# Patient Record
Sex: FEMALE | Race: WHITE | NOT HISPANIC OR LATINO | Employment: OTHER | URBAN - METROPOLITAN AREA
[De-identification: names, ages, dates, MRNs, and addresses within clinical notes are randomized per-mention and may not be internally consistent; named-entity substitution may affect disease eponyms.]

---

## 2023-05-11 RX ORDER — LOSARTAN POTASSIUM 50 MG/1
50 TABLET ORAL DAILY
COMMUNITY
End: 2023-05-30 | Stop reason: SDUPTHER

## 2023-05-11 RX ORDER — OXYBUTYNIN CHLORIDE 10 MG/1
10 TABLET, EXTENDED RELEASE ORAL DAILY
COMMUNITY
End: 2023-05-30 | Stop reason: SDUPTHER

## 2023-05-11 RX ORDER — VERAPAMIL HYDROCHLORIDE 240 MG/1
240 TABLET, FILM COATED, EXTENDED RELEASE ORAL DAILY
COMMUNITY
End: 2023-05-30 | Stop reason: SDUPTHER

## 2023-05-30 ENCOUNTER — LAB (OUTPATIENT)
Dept: LAB | Facility: LAB | Age: 73
End: 2023-05-30
Payer: COMMERCIAL

## 2023-05-30 ENCOUNTER — OFFICE VISIT (OUTPATIENT)
Dept: PRIMARY CARE | Facility: CLINIC | Age: 73
End: 2023-05-30
Payer: COMMERCIAL

## 2023-05-30 VITALS
DIASTOLIC BLOOD PRESSURE: 77 MMHG | HEIGHT: 62 IN | HEART RATE: 94 BPM | WEIGHT: 93.8 LBS | TEMPERATURE: 98.6 F | SYSTOLIC BLOOD PRESSURE: 134 MMHG | BODY MASS INDEX: 17.26 KG/M2

## 2023-05-30 DIAGNOSIS — R35.0 URINARY FREQUENCY: ICD-10-CM

## 2023-05-30 DIAGNOSIS — Z00.00 ANNUAL PHYSICAL EXAM: Primary | ICD-10-CM

## 2023-05-30 DIAGNOSIS — Z12.11 ENCOUNTER FOR SCREENING FOR MALIGNANT NEOPLASM OF COLON: ICD-10-CM

## 2023-05-30 DIAGNOSIS — Z00.00 ANNUAL PHYSICAL EXAM: ICD-10-CM

## 2023-05-30 DIAGNOSIS — N32.89 IRRITABLE BLADDER: ICD-10-CM

## 2023-05-30 DIAGNOSIS — R73.9 HYPERGLYCEMIA: ICD-10-CM

## 2023-05-30 DIAGNOSIS — J43.9 PULMONARY EMPHYSEMA, UNSPECIFIED EMPHYSEMA TYPE (MULTI): ICD-10-CM

## 2023-05-30 DIAGNOSIS — D75.1 ACQUIRED POLYCYTHEMIA: ICD-10-CM

## 2023-05-30 DIAGNOSIS — D47.2 MGUS (MONOCLONAL GAMMOPATHY OF UNKNOWN SIGNIFICANCE): ICD-10-CM

## 2023-05-30 DIAGNOSIS — E21.0 PRIMARY HYPERPARATHYROIDISM (MULTI): ICD-10-CM

## 2023-05-30 DIAGNOSIS — I10 PRIMARY HYPERTENSION: ICD-10-CM

## 2023-05-30 DIAGNOSIS — Z12.31 BREAST CANCER SCREENING BY MAMMOGRAM: ICD-10-CM

## 2023-05-30 PROBLEM — H40.9 GLAUCOMA: Status: ACTIVE | Noted: 2023-05-30

## 2023-05-30 PROBLEM — E78.5 HYPERLIPIDEMIA: Status: ACTIVE | Noted: 2023-05-30

## 2023-05-30 PROBLEM — I35.1 NONRHEUMATIC AORTIC VALVE INSUFFICIENCY: Status: ACTIVE | Noted: 2023-05-30

## 2023-05-30 PROBLEM — M85.80 OSTEOPENIA: Status: ACTIVE | Noted: 2023-05-30

## 2023-05-30 PROBLEM — J44.9 COPD (CHRONIC OBSTRUCTIVE PULMONARY DISEASE) (MULTI): Status: ACTIVE | Noted: 2023-05-30

## 2023-05-30 LAB
ALANINE AMINOTRANSFERASE (SGPT) (U/L) IN SER/PLAS: 10 U/L (ref 7–45)
ALBUMIN (G/DL) IN SER/PLAS: 4.5 G/DL (ref 3.4–5)
ALKALINE PHOSPHATASE (U/L) IN SER/PLAS: 111 U/L (ref 33–136)
ANION GAP IN SER/PLAS: 12 MMOL/L (ref 10–20)
ASPARTATE AMINOTRANSFERASE (SGOT) (U/L) IN SER/PLAS: 15 U/L (ref 9–39)
BASOPHILS (10*3/UL) IN BLOOD BY AUTOMATED COUNT: 0.03 X10E9/L (ref 0–0.1)
BASOPHILS/100 LEUKOCYTES IN BLOOD BY AUTOMATED COUNT: 0.4 % (ref 0–2)
BILIRUBIN TOTAL (MG/DL) IN SER/PLAS: 0.7 MG/DL (ref 0–1.2)
CALCIDIOL (25 OH VITAMIN D3) (NG/ML) IN SER/PLAS: 48 NG/ML
CALCIUM (MG/DL) IN SER/PLAS: 11.5 MG/DL (ref 8.6–10.6)
CARBON DIOXIDE, TOTAL (MMOL/L) IN SER/PLAS: 28 MMOL/L (ref 21–32)
CHLORIDE (MMOL/L) IN SER/PLAS: 108 MMOL/L (ref 98–107)
CREATININE (MG/DL) IN SER/PLAS: 0.71 MG/DL (ref 0.5–1.05)
EOSINOPHILS (10*3/UL) IN BLOOD BY AUTOMATED COUNT: 0.04 X10E9/L (ref 0–0.4)
EOSINOPHILS/100 LEUKOCYTES IN BLOOD BY AUTOMATED COUNT: 0.5 % (ref 0–6)
ERYTHROCYTE DISTRIBUTION WIDTH (RATIO) BY AUTOMATED COUNT: 12.8 % (ref 11.5–14.5)
ERYTHROCYTE MEAN CORPUSCULAR HEMOGLOBIN CONCENTRATION (G/DL) BY AUTOMATED: 32.6 G/DL (ref 32–36)
ERYTHROCYTE MEAN CORPUSCULAR VOLUME (FL) BY AUTOMATED COUNT: 103 FL (ref 80–100)
ERYTHROCYTES (10*6/UL) IN BLOOD BY AUTOMATED COUNT: 5.17 X10E12/L (ref 4–5.2)
ESTIMATED AVERAGE GLUCOSE FOR HBA1C: 100 MG/DL
GFR FEMALE: 89 ML/MIN/1.73M2
GLUCOSE (MG/DL) IN SER/PLAS: 79 MG/DL (ref 74–99)
HEMATOCRIT (%) IN BLOOD BY AUTOMATED COUNT: 53.4 % (ref 36–46)
HEMOGLOBIN (G/DL) IN BLOOD: 17.4 G/DL (ref 12–16)
HEMOGLOBIN A1C/HEMOGLOBIN TOTAL IN BLOOD: 5.1 %
IMMATURE GRANULOCYTES/100 LEUKOCYTES IN BLOOD BY AUTOMATED COUNT: 0.4 % (ref 0–0.9)
LEUKOCYTES (10*3/UL) IN BLOOD BY AUTOMATED COUNT: 8 X10E9/L (ref 4.4–11.3)
LYMPHOCYTES (10*3/UL) IN BLOOD BY AUTOMATED COUNT: 1.16 X10E9/L (ref 0.8–3)
LYMPHOCYTES/100 LEUKOCYTES IN BLOOD BY AUTOMATED COUNT: 14.5 % (ref 13–44)
MONOCYTES (10*3/UL) IN BLOOD BY AUTOMATED COUNT: 0.39 X10E9/L (ref 0.05–0.8)
MONOCYTES/100 LEUKOCYTES IN BLOOD BY AUTOMATED COUNT: 4.9 % (ref 2–10)
NEUTROPHILS (10*3/UL) IN BLOOD BY AUTOMATED COUNT: 6.37 X10E9/L (ref 1.6–5.5)
NEUTROPHILS/100 LEUKOCYTES IN BLOOD BY AUTOMATED COUNT: 79.3 % (ref 40–80)
NRBC (PER 100 WBCS) BY AUTOMATED COUNT: 0 /100 WBC (ref 0–0)
PLATELETS (10*3/UL) IN BLOOD AUTOMATED COUNT: 185 X10E9/L (ref 150–450)
POTASSIUM (MMOL/L) IN SER/PLAS: 4.7 MMOL/L (ref 3.5–5.3)
PROTEIN TOTAL: 6.6 G/DL (ref 6.4–8.2)
SODIUM (MMOL/L) IN SER/PLAS: 143 MMOL/L (ref 136–145)
UREA NITROGEN (MG/DL) IN SER/PLAS: 13 MG/DL (ref 6–23)

## 2023-05-30 PROCEDURE — 83970 ASSAY OF PARATHORMONE: CPT

## 2023-05-30 PROCEDURE — 80053 COMPREHEN METABOLIC PANEL: CPT

## 2023-05-30 PROCEDURE — 82306 VITAMIN D 25 HYDROXY: CPT

## 2023-05-30 PROCEDURE — 3078F DIAST BP <80 MM HG: CPT | Performed by: INTERNAL MEDICINE

## 2023-05-30 PROCEDURE — 83036 HEMOGLOBIN GLYCOSYLATED A1C: CPT

## 2023-05-30 PROCEDURE — 85025 COMPLETE CBC W/AUTO DIFF WBC: CPT

## 2023-05-30 PROCEDURE — 1159F MED LIST DOCD IN RCRD: CPT | Performed by: INTERNAL MEDICINE

## 2023-05-30 PROCEDURE — 4004F PT TOBACCO SCREEN RCVD TLK: CPT | Performed by: INTERNAL MEDICINE

## 2023-05-30 PROCEDURE — 1170F FXNL STATUS ASSESSED: CPT | Performed by: INTERNAL MEDICINE

## 2023-05-30 PROCEDURE — G0439 PPPS, SUBSEQ VISIT: HCPCS | Performed by: INTERNAL MEDICINE

## 2023-05-30 PROCEDURE — 3075F SYST BP GE 130 - 139MM HG: CPT | Performed by: INTERNAL MEDICINE

## 2023-05-30 PROCEDURE — 36415 COLL VENOUS BLD VENIPUNCTURE: CPT

## 2023-05-30 PROCEDURE — 1160F RVW MEDS BY RX/DR IN RCRD: CPT | Performed by: INTERNAL MEDICINE

## 2023-05-30 RX ORDER — OXYBUTYNIN CHLORIDE 10 MG/1
10 TABLET, EXTENDED RELEASE ORAL DAILY
Qty: 90 TABLET | Refills: 3 | Status: SHIPPED | OUTPATIENT
Start: 2023-05-30 | End: 2023-10-31 | Stop reason: SDUPTHER

## 2023-05-30 RX ORDER — VERAPAMIL HYDROCHLORIDE 240 MG/1
240 TABLET, FILM COATED, EXTENDED RELEASE ORAL DAILY
Qty: 90 TABLET | Refills: 3 | Status: SHIPPED | OUTPATIENT
Start: 2023-05-30 | End: 2023-10-31 | Stop reason: SDUPTHER

## 2023-05-30 RX ORDER — LOSARTAN POTASSIUM 50 MG/1
50 TABLET ORAL DAILY
Qty: 90 TABLET | Refills: 3 | Status: SHIPPED | OUTPATIENT
Start: 2023-05-30 | End: 2023-10-31 | Stop reason: SDUPTHER

## 2023-05-30 ASSESSMENT — ENCOUNTER SYMPTOMS
COUGH: 0
DEPRESSION: 0
CHILLS: 0
POLYDIPSIA: 0
PALPITATIONS: 0
OCCASIONAL FEELINGS OF UNSTEADINESS: 1
SHORTNESS OF BREATH: 0
LOSS OF SENSATION IN FEET: 0
FEVER: 0

## 2023-05-30 ASSESSMENT — ACTIVITIES OF DAILY LIVING (ADL)
DOING_HOUSEWORK: INDEPENDENT
MANAGING_FINANCES: INDEPENDENT
BATHING: INDEPENDENT
GROCERY_SHOPPING: INDEPENDENT
TAKING_MEDICATION: INDEPENDENT
DRESSING: INDEPENDENT

## 2023-05-30 ASSESSMENT — PATIENT HEALTH QUESTIONNAIRE - PHQ9
SUM OF ALL RESPONSES TO PHQ9 QUESTIONS 1 AND 2: 0
1. LITTLE INTEREST OR PLEASURE IN DOING THINGS: NOT AT ALL
2. FEELING DOWN, DEPRESSED OR HOPELESS: NOT AT ALL

## 2023-05-30 NOTE — PATIENT INSTRUCTIONS
Consider the vaccine we discussed, specifically pneumonia     See me in 4 to 5 months in the fall.

## 2023-05-30 NOTE — PROGRESS NOTES
"Subjective   Reason for Visit: Isidra Bond is an 72 y.o. female here for a Medicare Wellness visit.     Past Medical, Surgical, and Family History reviewed and updated in chart.    Reviewed all medications by prescribing practitioner or clinical pharmacist (such as prescriptions, OTCs, herbal therapies and supplements) and documented in the medical record.    Patient presents today for an annual wellness exam  Medical history and surgical history updated today  Medication list reconciled and updated  Patient denies vision issues at this time  Patient denies hearing issues at this time    Patient counseled about available preventative health vaccinations and provided with opportunity to update them with our office or through prescription to preferred pharmacy.    Reviewed and discussed preventative health screening recommendations for colon cancer: Pt declines after discussion    Reviewed and discussed preventative health recommendations for screening for Lung Cancer: Pt declines after discussion    Reviewed and discussed preventative health recommendations for screening for cervical cancer and breast cancer: Pt declines after discussion    Advised on Vaccine recommendations. Declines today, will consider for future visits.         Patient Care Team:  Stanislav Brooks MD as PCP - General     Review of Systems   Constitutional:  Negative for chills and fever.   Respiratory:  Negative for cough and shortness of breath.    Cardiovascular:  Negative for chest pain and palpitations.   Endocrine: Negative for polydipsia and polyuria.       Objective   Vitals:  /77   Pulse 94   Temp 37 °C (98.6 °F)   Ht 1.575 m (5' 2\")   Wt (!) 42.5 kg (93 lb 12.8 oz)   BMI 17.16 kg/m²       Physical Exam  Constitutional:       Appearance: Normal appearance.      Comments: Thin and frail appearance   HENT:      Head: Normocephalic and atraumatic.      Right Ear: Tympanic membrane normal.      Left Ear: Tympanic membrane " normal.      Nose: Nose normal.      Mouth/Throat:      Mouth: Mucous membranes are moist.   Eyes:      Extraocular Movements: Extraocular movements intact.      Conjunctiva/sclera: Conjunctivae normal.      Pupils: Pupils are equal, round, and reactive to light.   Neck:      Thyroid: No thyroid mass, thyromegaly or thyroid tenderness.      Vascular: No carotid bruit.   Cardiovascular:      Rate and Rhythm: Normal rate and regular rhythm.      Heart sounds: No murmur heard.     No friction rub. No gallop.   Pulmonary:      Effort: Pulmonary effort is normal. No respiratory distress.      Breath sounds: No wheezing, rhonchi or rales.      Comments: Markedly diminished lung sounds in all lung fields  Abdominal:      General: Bowel sounds are normal.      Palpations: Abdomen is soft.      Tenderness: There is no abdominal tenderness. There is no guarding.      Hernia: No hernia is present.   Musculoskeletal:      Cervical back: Neck supple. No tenderness.      Right lower leg: No edema.      Left lower leg: No edema.   Lymphadenopathy:      Cervical: No cervical adenopathy.   Skin:     Coloration: Skin is not jaundiced.   Neurological:      General: No focal deficit present.      Mental Status: She is alert and oriented to person, place, and time.   Psychiatric:         Mood and Affect: Mood normal.         Assessment/Plan   Problem List Items Addressed This Visit          Respiratory    COPD (chronic obstructive pulmonary disease) (CMS/Formerly [facility])    Overview     Stable, no exacerbations in 1 year. Advised on signs and symptoms to monitor for            Circulatory    Hypertension    Overview     Stable, continue current meds         Relevant Medications    verapamil SR (Calan-SR) 240 mg ER tablet    losartan (Cozaar) 50 mg tablet    Other Relevant Orders    Vitamin D, Total    Comprehensive Metabolic Panel    Hemoglobin A1C    PTH, intact    CBC and Auto Differential       Genitourinary    Irritable bladder    Relevant  Medications    oxybutynin XL (Ditropan-XL) 10 mg 24 hr tablet       Endocrine/Metabolic    Primary hyperparathyroidism (CMS/HCC)    Relevant Medications    verapamil SR (Calan-SR) 240 mg ER tablet    losartan (Cozaar) 50 mg tablet    Other Relevant Orders    Vitamin D, Total    Comprehensive Metabolic Panel    Hemoglobin A1C    PTH, intact    CBC and Auto Differential       Hematologic    Acquired polycythemia    Relevant Medications    verapamil SR (Calan-SR) 240 mg ER tablet    losartan (Cozaar) 50 mg tablet    Other Relevant Orders    Vitamin D, Total    Comprehensive Metabolic Panel    Hemoglobin A1C    PTH, intact    CBC and Auto Differential       Immune    MGUS (monoclonal gammopathy of unknown significance)    Relevant Medications    verapamil SR (Calan-SR) 240 mg ER tablet    losartan (Cozaar) 50 mg tablet    Other Relevant Orders    Vitamin D, Total    Comprehensive Metabolic Panel    Hemoglobin A1C    PTH, intact    CBC and Auto Differential     Other Visit Diagnoses       Annual physical exam    -  Primary    Relevant Orders    Vitamin D, Total    Comprehensive Metabolic Panel    Hemoglobin A1C    PTH, intact    CBC and Auto Differential    Encounter for screening for malignant neoplasm of colon        Breast cancer screening by mammogram        Urinary frequency        Relevant Orders    Hemoglobin A1C    Hyperglycemia        Relevant Orders    Hemoglobin A1C

## 2023-05-31 LAB — PARATHYRIN INTACT (PG/ML) IN SER/PLAS: 173.6 PG/ML (ref 18.5–88)

## 2023-05-31 NOTE — RESULT ENCOUNTER NOTE
Patient results are as follows:She continues to have a high parathyroid hormone a known issue from the past Dr. Vasquez was monitoring.  Although the change currently is that her calcium levels are starting to rise above normal and are 11.5 normal being 10.5 or less.High calcium levels combined with this can lead to bone thinningAn easy risk of fracture and very high calcium levels can lead toOther major side effects.  Because of thisDr. Vasquez previously recommended she start the medication called a bisphosphonate to protect her bonesAnd keep the calcium levels down.I am advising of the sameRecommendation.  Bisphosphonates can be done as a weekly pill Or by a yearly injection [facility]. I recommend the patient consider these options and I be happy to discuss them in more detail by telemedicine or at our next appt if unwilling to come in sooner

## 2023-06-05 DIAGNOSIS — E21.0 PRIMARY HYPERPARATHYROIDISM (MULTI): Primary | ICD-10-CM

## 2023-06-05 RX ORDER — ALENDRONATE SODIUM 70 MG/1
70 TABLET ORAL
Qty: 12 TABLET | Refills: 3 | Status: SHIPPED | OUTPATIENT
Start: 2023-06-05 | End: 2023-10-31 | Stop reason: SINTOL

## 2023-07-11 ENCOUNTER — TELEPHONE (OUTPATIENT)
Dept: PRIMARY CARE | Facility: CLINIC | Age: 73
End: 2023-07-11
Payer: COMMERCIAL

## 2023-07-11 NOTE — TELEPHONE ENCOUNTER
Side effect is noted and she may obviously continue off the medication at this time.  Monitor without any replacement or additional options for the next couple weeks and make sure that there is no problems that persist despite medication no longer in use.  We can revisit the topic at the next appointment and determine if she would like to try an alternative option which I will educate her about at that visit.

## 2023-07-11 NOTE — TELEPHONE ENCOUNTER
Patient said that she had a bad reaction to the Fosomax. When she took her 3rd dosage she started having pain in her legs and then a few days later she could barely stand up and walk. Slowly coming back after stopping the medication.

## 2023-08-30 ENCOUNTER — TELEPHONE (OUTPATIENT)
Dept: PRIMARY CARE | Facility: CLINIC | Age: 73
End: 2023-08-30
Payer: COMMERCIAL

## 2023-08-30 NOTE — TELEPHONE ENCOUNTER
Patient called a few weeks ago about a bad reaction to the Fosamax and she has stopped taken this medication. She is still dealing with the leg pain and is now losing her hair. She wanted to know if this going to continue or clear up?

## 2023-08-30 NOTE — TELEPHONE ENCOUNTER
For the possibility of pain subsequent to Fosamax it can take a couple weeks for the medicine to clear from the body but the amount of time you are on it before stopping it plays into that a bit.  If you could clarify the amount of time for me specifically that you are on it before stopping it that would be helpful.  Additionally, there are no case reports or evidence to support hair loss as a side effect of Fosamax.  Especially having been off of it now for a couple weeks and only recently reporting that it would not be due to this medication in my opinion at this time without further evidence or evaluation to prove otherwise.

## 2023-10-31 ENCOUNTER — OFFICE VISIT (OUTPATIENT)
Dept: PRIMARY CARE | Facility: CLINIC | Age: 73
End: 2023-10-31
Payer: COMMERCIAL

## 2023-10-31 ENCOUNTER — LAB (OUTPATIENT)
Dept: LAB | Facility: LAB | Age: 73
End: 2023-10-31
Payer: COMMERCIAL

## 2023-10-31 VITALS
WEIGHT: 92 LBS | BODY MASS INDEX: 16.83 KG/M2 | DIASTOLIC BLOOD PRESSURE: 81 MMHG | SYSTOLIC BLOOD PRESSURE: 131 MMHG | HEART RATE: 109 BPM | TEMPERATURE: 97.7 F

## 2023-10-31 DIAGNOSIS — I10 PRIMARY HYPERTENSION: ICD-10-CM

## 2023-10-31 DIAGNOSIS — E21.0 PRIMARY HYPERPARATHYROIDISM (MULTI): ICD-10-CM

## 2023-10-31 DIAGNOSIS — N32.89 IRRITABLE BLADDER: ICD-10-CM

## 2023-10-31 DIAGNOSIS — M85.89 OSTEOPENIA OF MULTIPLE SITES: Primary | ICD-10-CM

## 2023-10-31 DIAGNOSIS — D47.2 MGUS (MONOCLONAL GAMMOPATHY OF UNKNOWN SIGNIFICANCE): ICD-10-CM

## 2023-10-31 DIAGNOSIS — D75.1 ACQUIRED POLYCYTHEMIA: ICD-10-CM

## 2023-10-31 LAB
ANION GAP SERPL CALC-SCNC: 15 MMOL/L (ref 10–20)
BUN SERPL-MCNC: 10 MG/DL (ref 6–23)
CALCIUM SERPL-MCNC: 11 MG/DL (ref 8.6–10.6)
CHLORIDE SERPL-SCNC: 107 MMOL/L (ref 98–107)
CO2 SERPL-SCNC: 26 MMOL/L (ref 21–32)
CREAT SERPL-MCNC: 0.75 MG/DL (ref 0.5–1.05)
GFR SERPL CREATININE-BSD FRML MDRD: 83 ML/MIN/1.73M*2
GLUCOSE SERPL-MCNC: 76 MG/DL (ref 74–99)
POTASSIUM SERPL-SCNC: 4.5 MMOL/L (ref 3.5–5.3)
PTH-INTACT SERPL-MCNC: 209.8 PG/ML (ref 18.5–88)
SODIUM SERPL-SCNC: 143 MMOL/L (ref 136–145)

## 2023-10-31 PROCEDURE — 3079F DIAST BP 80-89 MM HG: CPT | Performed by: INTERNAL MEDICINE

## 2023-10-31 PROCEDURE — 36415 COLL VENOUS BLD VENIPUNCTURE: CPT

## 2023-10-31 PROCEDURE — 83970 ASSAY OF PARATHORMONE: CPT

## 2023-10-31 PROCEDURE — 99214 OFFICE O/P EST MOD 30 MIN: CPT | Performed by: INTERNAL MEDICINE

## 2023-10-31 PROCEDURE — 1159F MED LIST DOCD IN RCRD: CPT | Performed by: INTERNAL MEDICINE

## 2023-10-31 PROCEDURE — 80048 BASIC METABOLIC PNL TOTAL CA: CPT

## 2023-10-31 PROCEDURE — 1160F RVW MEDS BY RX/DR IN RCRD: CPT | Performed by: INTERNAL MEDICINE

## 2023-10-31 PROCEDURE — 3075F SYST BP GE 130 - 139MM HG: CPT | Performed by: INTERNAL MEDICINE

## 2023-10-31 PROCEDURE — 4004F PT TOBACCO SCREEN RCVD TLK: CPT | Performed by: INTERNAL MEDICINE

## 2023-10-31 RX ORDER — LOSARTAN POTASSIUM 50 MG/1
50 TABLET ORAL DAILY
Qty: 90 TABLET | Refills: 3 | Status: SHIPPED | OUTPATIENT
Start: 2023-10-31 | End: 2024-05-01 | Stop reason: SDUPTHER

## 2023-10-31 RX ORDER — OXYBUTYNIN CHLORIDE 15 MG/1
15 TABLET, EXTENDED RELEASE ORAL DAILY
Qty: 90 TABLET | Refills: 3 | Status: SHIPPED | OUTPATIENT
Start: 2023-10-31 | End: 2024-05-01 | Stop reason: SDUPTHER

## 2023-10-31 RX ORDER — LATANOPROST 50 UG/ML
SOLUTION/ DROPS OPHTHALMIC
COMMUNITY
Start: 2023-10-26

## 2023-10-31 RX ORDER — VERAPAMIL HYDROCHLORIDE 240 MG/1
240 TABLET, FILM COATED, EXTENDED RELEASE ORAL DAILY
Qty: 90 TABLET | Refills: 3 | Status: SHIPPED | OUTPATIENT
Start: 2023-10-31 | End: 2024-05-01 | Stop reason: SDUPTHER

## 2023-10-31 ASSESSMENT — ENCOUNTER SYMPTOMS
FEVER: 0
COUGH: 0
PALPITATIONS: 0
SHORTNESS OF BREATH: 0
CHILLS: 0
POLYDIPSIA: 0

## 2023-10-31 NOTE — PROGRESS NOTES
Subjective   Patient ID: Isidra Bond is a 73 y.o. female who presents for No chief complaint on file..    DEXA due- hx primary hyperparathyroid and hypercalcemia. Labs ordered. Discussed prolia. Will await DEXA for final determination    BP controlled. Meds refilled.     irritable bladder improved on rx, but still active, primarily at night. Disrupted sleep and fatigue as consequence.          Review of Systems   Constitutional:  Negative for chills and fever.   Respiratory:  Negative for cough and shortness of breath.    Cardiovascular:  Negative for chest pain and palpitations.   Endocrine: Negative for polydipsia and polyuria.       Objective   /81   Pulse 109   Temp 36.5 °C (97.7 °F)   Wt (!) 41.7 kg (92 lb)   BMI 16.83 kg/m²     Physical Exam  Constitutional:       Appearance: Normal appearance.   HENT:      Head: Normocephalic and atraumatic.   Eyes:      Extraocular Movements: Extraocular movements intact.      Pupils: Pupils are equal, round, and reactive to light.   Neck:      Thyroid: No thyroid mass or thyromegaly.      Vascular: No carotid bruit.   Cardiovascular:      Rate and Rhythm: Normal rate and regular rhythm.      Heart sounds: No murmur heard.     No friction rub. No gallop.   Pulmonary:      Effort: No respiratory distress.      Breath sounds: No wheezing, rhonchi or rales.   Musculoskeletal:      Cervical back: Neck supple.      Right lower leg: No edema.      Left lower leg: No edema.   Lymphadenopathy:      Cervical: No cervical adenopathy.   Neurological:      Mental Status: She is alert.         Assessment/Plan   Problem List Items Addressed This Visit             ICD-10-CM    Acquired polycythemia D75.1    Relevant Medications    losartan (Cozaar) 50 mg tablet    verapamil SR (Calan-SR) 240 mg ER tablet    MGUS (monoclonal gammopathy of unknown significance) D47.2    Relevant Medications    losartan (Cozaar) 50 mg tablet    verapamil SR (Calan-SR) 240 mg ER tablet     Osteopenia - Primary M85.80    Relevant Orders    XR DEXA bone density    Hypertension I10    Relevant Medications    losartan (Cozaar) 50 mg tablet    verapamil SR (Calan-SR) 240 mg ER tablet    Primary hyperparathyroidism (CMS/HCC) E21.0    Relevant Medications    losartan (Cozaar) 50 mg tablet    verapamil SR (Calan-SR) 240 mg ER tablet    Other Relevant Orders    XR DEXA bone density    Basic metabolic panel    PTH, intact    Irritable bladder N32.89    Relevant Medications    oxybutynin XL (Ditropan-XL) 15 mg 24 hr tablet

## 2023-11-01 NOTE — RESULT ENCOUNTER NOTE
Stable mildly high calcium levels. Continue monitoring every 6 months. Await dexa and final decision then

## 2023-12-27 ENCOUNTER — APPOINTMENT (OUTPATIENT)
Dept: RADIOLOGY | Facility: HOSPITAL | Age: 73
End: 2023-12-27
Payer: COMMERCIAL

## 2023-12-27 ENCOUNTER — HOSPITAL ENCOUNTER (EMERGENCY)
Facility: HOSPITAL | Age: 73
Discharge: HOME | End: 2023-12-27
Attending: STUDENT IN AN ORGANIZED HEALTH CARE EDUCATION/TRAINING PROGRAM
Payer: COMMERCIAL

## 2023-12-27 ENCOUNTER — HOSPITAL ENCOUNTER (EMERGENCY)
Facility: HOSPITAL | Age: 73
Discharge: HOME | End: 2023-12-27
Payer: COMMERCIAL

## 2023-12-27 VITALS
WEIGHT: 92 LBS | DIASTOLIC BLOOD PRESSURE: 93 MMHG | HEART RATE: 79 BPM | HEIGHT: 62 IN | OXYGEN SATURATION: 94 % | SYSTOLIC BLOOD PRESSURE: 157 MMHG | TEMPERATURE: 98.1 F | RESPIRATION RATE: 16 BRPM | BODY MASS INDEX: 16.93 KG/M2

## 2023-12-27 VITALS
BODY MASS INDEX: 16.93 KG/M2 | WEIGHT: 92 LBS | TEMPERATURE: 98.2 F | RESPIRATION RATE: 16 BRPM | HEART RATE: 86 BPM | HEIGHT: 62 IN | OXYGEN SATURATION: 95 % | SYSTOLIC BLOOD PRESSURE: 106 MMHG | DIASTOLIC BLOOD PRESSURE: 57 MMHG

## 2023-12-27 DIAGNOSIS — L08.9 INFECTED WOUND: Primary | ICD-10-CM

## 2023-12-27 DIAGNOSIS — T14.8XXA INFECTED WOUND: Primary | ICD-10-CM

## 2023-12-27 PROCEDURE — 4500999001 HC ED NO CHARGE

## 2023-12-27 PROCEDURE — 2500000004 HC RX 250 GENERAL PHARMACY W/ HCPCS (ALT 636 FOR OP/ED): Performed by: STUDENT IN AN ORGANIZED HEALTH CARE EDUCATION/TRAINING PROGRAM

## 2023-12-27 PROCEDURE — 73590 X-RAY EXAM OF LOWER LEG: CPT | Mod: RIGHT SIDE | Performed by: STUDENT IN AN ORGANIZED HEALTH CARE EDUCATION/TRAINING PROGRAM

## 2023-12-27 PROCEDURE — 99283 EMERGENCY DEPT VISIT LOW MDM: CPT | Mod: 25 | Performed by: STUDENT IN AN ORGANIZED HEALTH CARE EDUCATION/TRAINING PROGRAM

## 2023-12-27 PROCEDURE — 73590 X-RAY EXAM OF LOWER LEG: CPT | Mod: RT

## 2023-12-27 PROCEDURE — 99281 EMR DPT VST MAYX REQ PHY/QHP: CPT

## 2023-12-27 RX ORDER — SULFAMETHOXAZOLE AND TRIMETHOPRIM 800; 160 MG/1; MG/1
1 TABLET ORAL ONCE
Status: COMPLETED | OUTPATIENT
Start: 2023-12-27 | End: 2023-12-27

## 2023-12-27 RX ORDER — SULFAMETHOXAZOLE AND TRIMETHOPRIM 800; 160 MG/1; MG/1
1 TABLET ORAL EVERY 12 HOURS
Qty: 20 TABLET | Refills: 0 | Status: SHIPPED | OUTPATIENT
Start: 2023-12-27 | End: 2024-01-06

## 2023-12-27 RX ADMIN — SULFAMETHOXAZOLE AND TRIMETHOPRIM 1 TABLET: 800; 160 TABLET ORAL at 22:48

## 2023-12-27 ASSESSMENT — LIFESTYLE VARIABLES
HAVE YOU EVER FELT YOU SHOULD CUT DOWN ON YOUR DRINKING: NO
HAVE PEOPLE ANNOYED YOU BY CRITICIZING YOUR DRINKING: NO
REASON UNABLE TO ASSESS: NO
EVER FELT BAD OR GUILTY ABOUT YOUR DRINKING: NO
EVER HAD A DRINK FIRST THING IN THE MORNING TO STEADY YOUR NERVES TO GET RID OF A HANGOVER: NO

## 2023-12-27 ASSESSMENT — COLUMBIA-SUICIDE SEVERITY RATING SCALE - C-SSRS
1. IN THE PAST MONTH, HAVE YOU WISHED YOU WERE DEAD OR WISHED YOU COULD GO TO SLEEP AND NOT WAKE UP?: NO
6. HAVE YOU EVER DONE ANYTHING, STARTED TO DO ANYTHING, OR PREPARED TO DO ANYTHING TO END YOUR LIFE?: NO
1. IN THE PAST MONTH, HAVE YOU WISHED YOU WERE DEAD OR WISHED YOU COULD GO TO SLEEP AND NOT WAKE UP?: NO
2. HAVE YOU ACTUALLY HAD ANY THOUGHTS OF KILLING YOURSELF?: NO
6. HAVE YOU EVER DONE ANYTHING, STARTED TO DO ANYTHING, OR PREPARED TO DO ANYTHING TO END YOUR LIFE?: NO
2. HAVE YOU ACTUALLY HAD ANY THOUGHTS OF KILLING YOURSELF?: NO

## 2023-12-27 ASSESSMENT — PAIN SCALES - GENERAL
PAINLEVEL_OUTOF10: 4
PAINLEVEL_OUTOF10: 2

## 2023-12-27 ASSESSMENT — PAIN DESCRIPTION - PAIN TYPE: TYPE: ACUTE PAIN

## 2023-12-27 ASSESSMENT — PAIN - FUNCTIONAL ASSESSMENT
PAIN_FUNCTIONAL_ASSESSMENT: 0-10
PAIN_FUNCTIONAL_ASSESSMENT: 0-10

## 2023-12-28 NOTE — ED PROVIDER NOTES
History/Exam limitations: none  HPI was provided by patient    HPI:    Chief Complaint   Patient presents with    Wound Check     PT presents to the ER with a right leg infection. PT states that on December 14th she cut her lower leg/ankle on a chair. Pt has been self medicating at home with neosporin and hydrogen peroxide. Pt's wound is red and hot to the touch. Msps are intact.          Isidra Bond is a 73 y.o. female presents with chief complaint of wound check.  Patient 2 weeks ago had fallen trying to hang a wreath and suffered a wound to her right shin.  States over the past few days has become warm to touch and erythematous.  She has been using Neosporin on the area at home but feels it is infected so came here to be evaluated.  Denies any numbness tingling weakness.  No pus or purulent drainage she states.    ROS:  CONSTITUTIONAL:       fever, chills  ENT:       sore throat, congestion, rhinorrhea  CARDIOVASCULAR:       chest pain, palpitations, swelling  RESPIRATORY:       cough, wheeze, shortness of breath  GI:       nausea, vomiting, diarrhea, abdominal pain  MUSCULOSKELETAL:       deformity, neck pain  SKIN:       erythema, lesion, drainage, swelling  NEUROLOGIC:       headache, numbness, focal weakness  NOTES: All systems reviewed, negative except as described above       Physical Exam:  GENERAL: Alert, oriented , cooperative,  in no acute distress.  HEAD: normocephalic, atraumatic  SKIN: Wound right lower extremity just proximal to right ankle.  Full range of motion to ankle and foot.  Slight erythema Unice and scab formation with some purulence dry around outside.  Patient states she has been using peroxide and noticed that color change to the wound.  Warm to touch and tender to palpation.   No pain out of proportion to exam, compartment soft.  See photo below for further detail  NECK: Supple, without meningismus. Trachea at midline. No lymphadenopathy.  PULMONARY: Clear bilaterally. No  crackles, rhonchi, wheezing.  No respiratory distress.  No work of breathing.  CARDIAC: Regular rate and regular rhythm.  Pulses 2+ in radials and dorsal pedal pulses bilaterally.  No murmur, rub, gallop.  No edema.  ABDOMEN: Soft, nontender, active bowel sounds.  No palpable organomegaly.  No rebound or guarding.  No CVA tenderness.  No pulsatile masses.  MUSCULOSKELETAL: Full range of motion throughout, no deformity.   NEUROLOGICAL: Neurovascular intact in bilateral lower extremities.  PSYCHIATRIC: Appropriate mood and affect. Calm.             MDM/ED COURSE:      The patient presented for evaluation of wound check.  Differential clues not limited to cellulitis, osteomyelitis, infected wound.  Patient's examination consistent with cellulitis/infected wound.  Patient offered admission and IV antibiotics but adamant she want to be discharged home and does not want to be admitted.  Daughter present bedside confirming this that she would keep a close eye on her and we will give her a dose of Bactrim here and prescription to go home with.  Patient feels safe for discharge home.  X-ray reassuring..  Patient nontoxic-appearing on reexamination.  Vital signs are stable.   The patient and caregiver are in agreement with the plan and given instructions to follow up with their PCP, wound clinic.      I discussed the differential, results and plan with the patient and/or family/friend/caregiver if present.       I emphasized the importance of follow-up with the physician I referred them to in the timeframe recommended.  I explained reasons for the patient to return to the Emergency Department. Additional verbal discharge instructions were also given and discussed with the patient to supplement those generated by the EMR. We also discussed medications that were prescribed (if any) including common side effects and interactions. The patient was advised to abstain from driving, operating heavy machinery or making significant  decisions while taking medications such as opiates and muscle relaxers that may impair this. All questions were addressed.  They understand return precautions and discharge instructions. The patient and/or family/friend/caregiver expressed understanding.     Note: This note was dictated by speech recognition. Minor errors in transcription may be present.    Diagnoses as of 12/27/23 2305   Infected wound         Past Medical History:   Diagnosis Date    Bitten by cat, initial encounter 11/28/2016    Cat bite    Encounter for gynecological examination (general) (routine) without abnormal findings 03/28/2017    Visit for routine gyn exam    Encounter for screening for malignant neoplasm of cervix 02/23/2015    Screening for malignant neoplasm of cervix    Pelvic and perineal pain 03/19/2018    Pelvic pain    Personal history of other diseases of the female genital tract 09/14/2020    History of vaginal pruritus    Personal history of other diseases of the nervous system and sense organs 06/06/2013    History of acute otitis media    Personal history of other endocrine, nutritional and metabolic disease 03/28/2017    History of hyperparathyroidism    Personal history of other specified conditions 06/05/2017    History of epigastric pain    Pleurodynia 02/09/2021    Rib pain    Rash and other nonspecific skin eruption 04/01/2016    Rash    Unspecified abnormal findings in urine     Abnormal urine finding    Urinary tract infection, site not specified 09/05/2019    Acute UTI      Social History     Socioeconomic History    Marital status:      Spouse name: Not on file    Number of children: Not on file    Years of education: Not on file    Highest education level: Not on file   Occupational History    Not on file   Tobacco Use    Smoking status: Every Day     Types: Cigarettes    Smokeless tobacco: Never   Substance and Sexual Activity    Alcohol use: Not on file    Drug use: Not on file    Sexual activity: Not on  file   Other Topics Concern    Not on file   Social History Narrative    Not on file     Social Determinants of Health     Financial Resource Strain: Not on file   Food Insecurity: Not on file   Transportation Needs: Not on file   Physical Activity: Not on file   Stress: Not on file   Social Connections: Not on file   Intimate Partner Violence: Not on file   Housing Stability: Not on file     Current Outpatient Medications   Medication Instructions    latanoprost (Xalatan) 0.005 % ophthalmic solution     losartan (COZAAR) 50 mg, oral, Daily    oxybutynin XL (DITROPAN-XL) 15 mg, oral, Daily    verapamil SR (CALAN-SR) 240 mg, oral, Daily     Allergies   Allergen Reactions    Alendronate Swelling    Penicillins Unknown             ED Triage Vitals [12/27/23 1859]   Temp Heart Rate Resp BP   36.7 °C (98.1 °F) 108 19 147/62      SpO2 Temp Source Heart Rate Source Patient Position   94 % Tympanic Monitor Sitting      BP Location FiO2 (%)     Left arm --               Labs and Imaging  XR tibia fibula right 2 views   Final Result   Soft tissue swelling overlies the medial malleolus without evidence   of displaced fracture or traumatic malalignment of the right tibia   and fibula.             MACRO:   None        Signed by: Jenelle Appiah 12/27/2023 10:57 PM   Dictation workstation:   [id number]        Labs Reviewed - No data to display        Procedure  Procedures                  Isidro Newell DO  12/27/23 8481

## 2024-01-04 ENCOUNTER — CLINICAL SUPPORT (OUTPATIENT)
Dept: WOUND CARE | Facility: HOSPITAL | Age: 74
End: 2024-01-04
Payer: MEDICARE

## 2024-01-04 DIAGNOSIS — I73.9 PERIPHERAL VASCULAR DISEASE (CMS-HCC): Primary | ICD-10-CM

## 2024-01-04 PROCEDURE — 11042 DBRDMT SUBQ TIS 1ST 20SQCM/<: CPT

## 2024-01-04 PROCEDURE — 99213 OFFICE O/P EST LOW 20 MIN: CPT | Mod: 25

## 2024-01-11 ENCOUNTER — CLINICAL SUPPORT (OUTPATIENT)
Dept: WOUND CARE | Facility: HOSPITAL | Age: 74
End: 2024-01-11
Payer: MEDICARE

## 2024-01-11 PROCEDURE — 11042 DBRDMT SUBQ TIS 1ST 20SQCM/<: CPT

## 2024-01-18 ENCOUNTER — APPOINTMENT (OUTPATIENT)
Dept: VASCULAR MEDICINE | Facility: HOSPITAL | Age: 74
End: 2024-01-18
Payer: COMMERCIAL

## 2024-01-18 ENCOUNTER — APPOINTMENT (OUTPATIENT)
Dept: WOUND CARE | Facility: HOSPITAL | Age: 74
End: 2024-01-18
Payer: COMMERCIAL

## 2024-01-25 ENCOUNTER — CLINICAL SUPPORT (OUTPATIENT)
Dept: WOUND CARE | Facility: HOSPITAL | Age: 74
End: 2024-01-25
Payer: COMMERCIAL

## 2024-01-25 PROCEDURE — 11042 DBRDMT SUBQ TIS 1ST 20SQCM/<: CPT

## 2024-02-01 ENCOUNTER — CLINICAL SUPPORT (OUTPATIENT)
Dept: WOUND CARE | Facility: HOSPITAL | Age: 74
End: 2024-02-01
Payer: MEDICARE

## 2024-02-01 PROCEDURE — 11042 DBRDMT SUBQ TIS 1ST 20SQCM/<: CPT

## 2024-02-02 ENCOUNTER — HOSPITAL ENCOUNTER (OUTPATIENT)
Dept: VASCULAR MEDICINE | Facility: CLINIC | Age: 74
Discharge: HOME | End: 2024-02-02
Payer: COMMERCIAL

## 2024-02-02 DIAGNOSIS — I73.9 PERIPHERAL VASCULAR DISEASE (CMS-HCC): ICD-10-CM

## 2024-02-02 PROCEDURE — 93923 UPR/LXTR ART STDY 3+ LVLS: CPT

## 2024-02-02 PROCEDURE — 93923 UPR/LXTR ART STDY 3+ LVLS: CPT | Performed by: INTERNAL MEDICINE

## 2024-02-08 ENCOUNTER — CLINICAL SUPPORT (OUTPATIENT)
Dept: WOUND CARE | Facility: HOSPITAL | Age: 74
End: 2024-02-08
Payer: MEDICARE

## 2024-02-08 PROCEDURE — 11042 DBRDMT SUBQ TIS 1ST 20SQCM/<: CPT

## 2024-02-15 ENCOUNTER — OFFICE VISIT (OUTPATIENT)
Dept: CARDIOLOGY | Facility: HOSPITAL | Age: 74
End: 2024-02-15
Payer: COMMERCIAL

## 2024-02-15 ENCOUNTER — CLINICAL SUPPORT (OUTPATIENT)
Dept: WOUND CARE | Facility: HOSPITAL | Age: 74
End: 2024-02-15
Payer: COMMERCIAL

## 2024-02-15 VITALS
SYSTOLIC BLOOD PRESSURE: 120 MMHG | HEART RATE: 87 BPM | BODY MASS INDEX: 17.08 KG/M2 | DIASTOLIC BLOOD PRESSURE: 74 MMHG | WEIGHT: 92.81 LBS | OXYGEN SATURATION: 97 % | HEIGHT: 62 IN

## 2024-02-15 DIAGNOSIS — I73.9 PERIPHERAL ARTERIAL DISEASE (CMS-HCC): Primary | ICD-10-CM

## 2024-02-15 PROCEDURE — 3078F DIAST BP <80 MM HG: CPT | Performed by: INTERNAL MEDICINE

## 2024-02-15 PROCEDURE — 1159F MED LIST DOCD IN RCRD: CPT | Performed by: INTERNAL MEDICINE

## 2024-02-15 PROCEDURE — 99214 OFFICE O/P EST MOD 30 MIN: CPT | Performed by: INTERNAL MEDICINE

## 2024-02-15 PROCEDURE — 99204 OFFICE O/P NEW MOD 45 MIN: CPT | Performed by: INTERNAL MEDICINE

## 2024-02-15 PROCEDURE — 3074F SYST BP LT 130 MM HG: CPT | Performed by: INTERNAL MEDICINE

## 2024-02-15 PROCEDURE — 11042 DBRDMT SUBQ TIS 1ST 20SQCM/<: CPT

## 2024-02-15 NOTE — PROGRESS NOTES
Primary Care Physician: Stanislav Brooks MD   Date of Visit: 02/15/2024 11:00 AM EST  Type of Visit: New Patient      Chief Complaint:  Chief Complaint   Patient presents with    Right lower leg wound        HPI  Isidra Bond 73 y.o. female who presents establish care.    Dec 12, fell and hit leg. Non-healing anterior shin wound since then. Reports it has been very slow to heal though at today's visit, things were looking better. Also reports right calf claudication (pain and extreme fatigue); occurs can get to her mailbox and back, which is about 50 feet.     Review of Systems   12 point review of systems was obtained in detail and is negative other than that noted above or below.       Past Medical/Surgical History:  Isidra has a past medical history of Bitten by cat, initial encounter (11/28/2016), Encounter for gynecological examination (general) (routine) without abnormal findings (03/28/2017), Encounter for screening for malignant neoplasm of cervix (02/23/2015), Pelvic and perineal pain (03/19/2018), Personal history of other diseases of the female genital tract (09/14/2020), Personal history of other diseases of the nervous system and sense organs (06/06/2013), Personal history of other endocrine, nutritional and metabolic disease (03/28/2017), Personal history of other specified conditions (06/05/2017), Pleurodynia (02/09/2021), Rash and other nonspecific skin eruption (04/01/2016), Unspecified abnormal findings in urine, and Urinary tract infection, site not specified (09/05/2019).    Isidra has a past surgical history that includes Other surgical history (05/05/2022); Tubal ligation (02/24/2016); Other surgical history (02/24/2016); Mouth surgery (02/24/2016); Neck surgery (02/24/2016); Tonsillectomy (02/24/2016); and Colonoscopy (09/14/2020).    Social/Family History:  Isidra reports that she has been smoking cigarettes. She has never used smokeless tobacco. She reports current alcohol use of  about 1.0 standard drink of alcohol per week. She reports that she does not use drugs.    Isidra's family history includes Alzheimer's disease in an other family member; Parkinsonism in an other family member; Peripheral vascular disease in an other family member.    Allergies:  Allergies   Allergen Reactions    Alendronate Swelling    Penicillins Unknown       Medications:  Current Outpatient Medications on File Prior to Visit   Medication Sig    latanoprost (Xalatan) 0.005 % ophthalmic solution     losartan (Cozaar) 50 mg tablet Take 1 tablet (50 mg) by mouth once daily.    oxybutynin XL (Ditropan-XL) 15 mg 24 hr tablet Take 1 tablet (15 mg) by mouth once daily.    verapamil SR (Calan-SR) 240 mg ER tablet Take 1 tablet (240 mg) by mouth once daily.     No current facility-administered medications on file prior to visit.       Objective:   Vitals:    02/15/24 1105   BP: 120/74   Pulse: 87   SpO2: 97%       S1-S2 normal, regular rate and rhythm, normal rubs or gallops  Clear to auscultation bilaterally    Labs and Imaging:      Latest Ref Rng & Units 6/8/2021    10:02 AM 9/14/2021     8:33 AM 2/23/2022     6:23 PM 2/24/2022     7:31 AM 2/24/2022    12:23 PM 5/30/2023     9:19 AM 10/31/2023     8:59 AM   Electrolytes   Na 136 - 145 mmol/L 141  144  139  145  138  143  143    K 3.5 - 5.3 mmol/L 4.0  3.8  4.6  2.3  4.7  4.7  4.5    Cl 98 - 107 mmol/L 98  104  102  121  105  108  107    CO2 21 - 32 mmol/L 38  30  28  17  28  28  26    Cr 0.50 - 1.05 mg/dL 0.70  0.77  0.69  0.25  0.58  0.71  0.75    Ca 8.6 - 10.6 mg/dL 11.9  11.5  11.0  5.1  9.9  11.5  11.0    Phos 2.5 - 4.9 mg/dL 2.5  2.9               Latest Ref Rng & Units 6/6/2021     4:36 AM 6/7/2021     4:48 AM 6/8/2021    10:02 AM 2/23/2022     6:23 PM 2/24/2022     5:44 AM 2/24/2022    12:23 PM 5/30/2023     9:19 AM   CBC   Hb 12.0 - 16.0 g/dL 15.5  14.4  15.5  17.2  13.7  16.0  17.4    Hct 36.0 - 46.0 % 47.1  44.0  46.4  51.0  42.2  47.7  53.4    MCV 80 - 100  fL 107  106  105  104  108  103  103    RDW 11.5 - 14.5 % 12.4  12.1  12.4  13.6  13.3  13.4  12.8          Latest Ref Rng & Units 5/10/2018    12:00 AM 9/5/2019     9:36 AM 9/14/2020     9:40 AM 6/5/2021    10:31 PM 6/8/2021    10:02 AM 2/23/2022     6:23 PM 5/30/2023     9:19 AM   Lipids   Chol 0 - 199 mg/dL 200  219  221        HDL mg/dL 68.9  74.6  74.7        LDL 0 - 99 mg/dL 114  125  130        Trig 0 - 149 mg/dL 88  98  83        ALT 7 - 45 U/L 13  11  12  15  17  18  10    AST 9 - 39 U/L 14  14  14  13  16  22  15          Latest Ref Rng & Units 5/10/2018    12:00 AM 9/5/2019     9:36 AM 9/14/2020     9:40 AM 5/5/2022    11:08 AM   Thyroid   TSH 0.44 - 3.98 mIU/L 1.39  1.33  1.33  1.40           No data to display                 Lab Results   Component Value Date    HGBA1C 5.1 05/30/2023        The ASCVD Risk score (Rachael DK, et al., 2019) failed to calculate for the following reasons:    Cannot find a previous HDL lab    Cannot find a previous total cholesterol lab     Echocardiogram: No results found for this or any previous visit.     Echocardiogram: No results found for this or any previous visit from the past 1825 days.    Stress Testing: No results found for this or any previous visit from the past 1825 days.    Cardiac Catheterization: No results found for this or any previous visit from the past 1825 days.    Cardiac Scoring: No results found for this or any previous visit from the past 1825 days.    AAA : No results found for this or any previous visit from the past 1825 days.    OTHER: No results found for this or any previous visit from the past 1825 days.        Assessment/Plan:   No diagnosis found.     Isidra Bond 73 y.o. female who presents for right critical limb ischemia, [place] class V:    1.  Right critical limb ischemia  2.  Peripheral arterial disease    Reviewed PVR.    She reports that the wound has started to progress and is now healing.  Will defer intervention at this  time.  Will have her follow-up in 2 to 3 months.  I have told her to discuss this with her wound specialist, if they do not feel things are moving along, would need to proceed with peripheral angiogram with intervention.      Time Spent: I spent 20 minutes reviewing medical testing, obtaining medical history and counselling and educating on diagnosis and documenting clinical encounter.         ____________________________________________________________  Mikelnely Wilburn MD

## 2024-02-22 ENCOUNTER — CLINICAL SUPPORT (OUTPATIENT)
Dept: WOUND CARE | Facility: HOSPITAL | Age: 74
End: 2024-02-22
Payer: MEDICARE

## 2024-02-22 PROCEDURE — 11042 DBRDMT SUBQ TIS 1ST 20SQCM/<: CPT

## 2024-02-29 ENCOUNTER — CLINICAL SUPPORT (OUTPATIENT)
Dept: WOUND CARE | Facility: HOSPITAL | Age: 74
End: 2024-02-29
Payer: MEDICARE

## 2024-02-29 PROCEDURE — 11042 DBRDMT SUBQ TIS 1ST 20SQCM/<: CPT | Mod: MUE

## 2024-03-07 ENCOUNTER — APPOINTMENT (OUTPATIENT)
Dept: WOUND CARE | Facility: HOSPITAL | Age: 74
End: 2024-03-07
Payer: COMMERCIAL

## 2024-03-14 ENCOUNTER — CLINICAL SUPPORT (OUTPATIENT)
Dept: WOUND CARE | Facility: HOSPITAL | Age: 74
End: 2024-03-14
Payer: MEDICARE

## 2024-03-14 PROCEDURE — 11042 DBRDMT SUBQ TIS 1ST 20SQCM/<: CPT

## 2024-03-15 ENCOUNTER — APPOINTMENT (OUTPATIENT)
Dept: CARDIOLOGY | Facility: HOSPITAL | Age: 74
End: 2024-03-15
Payer: COMMERCIAL

## 2024-03-21 ENCOUNTER — CLINICAL SUPPORT (OUTPATIENT)
Dept: WOUND CARE | Facility: HOSPITAL | Age: 74
End: 2024-03-21
Payer: MEDICARE

## 2024-03-21 PROCEDURE — 11042 DBRDMT SUBQ TIS 1ST 20SQCM/<: CPT

## 2024-03-28 ENCOUNTER — CLINICAL SUPPORT (OUTPATIENT)
Dept: WOUND CARE | Facility: HOSPITAL | Age: 74
End: 2024-03-28
Payer: MEDICARE

## 2024-03-28 PROCEDURE — 11042 DBRDMT SUBQ TIS 1ST 20SQCM/<: CPT

## 2024-04-01 ENCOUNTER — HOSPITAL ENCOUNTER (OUTPATIENT)
Dept: RADIOLOGY | Facility: HOSPITAL | Age: 74
Discharge: HOME | End: 2024-04-01
Payer: COMMERCIAL

## 2024-04-01 DIAGNOSIS — M85.89 OSTEOPENIA OF MULTIPLE SITES: ICD-10-CM

## 2024-04-01 DIAGNOSIS — E21.0 PRIMARY HYPERPARATHYROIDISM (MULTI): ICD-10-CM

## 2024-04-01 PROCEDURE — 77080 DXA BONE DENSITY AXIAL: CPT

## 2024-04-03 ENCOUNTER — CLINICAL SUPPORT (OUTPATIENT)
Dept: WOUND CARE | Facility: HOSPITAL | Age: 74
End: 2024-04-03
Payer: MEDICARE

## 2024-04-03 PROCEDURE — 11043 DBRDMT MUSC&/FSCA 1ST 20/<: CPT

## 2024-04-04 ENCOUNTER — APPOINTMENT (OUTPATIENT)
Dept: WOUND CARE | Facility: HOSPITAL | Age: 74
End: 2024-04-04
Payer: COMMERCIAL

## 2024-04-10 ENCOUNTER — CLINICAL SUPPORT (OUTPATIENT)
Dept: WOUND CARE | Facility: HOSPITAL | Age: 74
End: 2024-04-10
Payer: COMMERCIAL

## 2024-04-10 PROCEDURE — 11042 DBRDMT SUBQ TIS 1ST 20SQCM/<: CPT

## 2024-04-18 ENCOUNTER — CLINICAL SUPPORT (OUTPATIENT)
Dept: WOUND CARE | Facility: HOSPITAL | Age: 74
End: 2024-04-18
Payer: COMMERCIAL

## 2024-04-18 PROCEDURE — 11042 DBRDMT SUBQ TIS 1ST 20SQCM/<: CPT

## 2024-04-25 ENCOUNTER — APPOINTMENT (OUTPATIENT)
Dept: WOUND CARE | Facility: HOSPITAL | Age: 74
End: 2024-04-25
Payer: MEDICARE

## 2024-04-25 ENCOUNTER — TELEPHONE (OUTPATIENT)
Dept: ORTHOPEDIC SURGERY | Facility: CLINIC | Age: 74
End: 2024-04-25
Payer: COMMERCIAL

## 2024-04-25 NOTE — TELEPHONE ENCOUNTER
Patient called stating she gave you a paper for the doctor to sign from [facility] about 2 weeks ago and she hasn't received it back, do you have this?

## 2024-05-01 ENCOUNTER — OFFICE VISIT (OUTPATIENT)
Dept: PRIMARY CARE | Facility: CLINIC | Age: 74
End: 2024-05-01
Payer: COMMERCIAL

## 2024-05-01 ENCOUNTER — LAB (OUTPATIENT)
Dept: LAB | Facility: LAB | Age: 74
End: 2024-05-01
Payer: COMMERCIAL

## 2024-05-01 VITALS
HEIGHT: 62 IN | SYSTOLIC BLOOD PRESSURE: 105 MMHG | WEIGHT: 93.6 LBS | DIASTOLIC BLOOD PRESSURE: 48 MMHG | BODY MASS INDEX: 17.23 KG/M2 | HEART RATE: 99 BPM | TEMPERATURE: 97.6 F

## 2024-05-01 DIAGNOSIS — D47.2 MGUS (MONOCLONAL GAMMOPATHY OF UNKNOWN SIGNIFICANCE): ICD-10-CM

## 2024-05-01 DIAGNOSIS — N32.89 IRRITABLE BLADDER: ICD-10-CM

## 2024-05-01 DIAGNOSIS — E21.0 PRIMARY HYPERPARATHYROIDISM (MULTI): ICD-10-CM

## 2024-05-01 DIAGNOSIS — M81.6 LOCALIZED OSTEOPOROSIS WITHOUT CURRENT PATHOLOGICAL FRACTURE: ICD-10-CM

## 2024-05-01 DIAGNOSIS — I10 PRIMARY HYPERTENSION: ICD-10-CM

## 2024-05-01 DIAGNOSIS — M81.6 LOCALIZED OSTEOPOROSIS WITHOUT CURRENT PATHOLOGICAL FRACTURE: Primary | ICD-10-CM

## 2024-05-01 DIAGNOSIS — D75.1 ACQUIRED POLYCYTHEMIA: ICD-10-CM

## 2024-05-01 LAB
ALBUMIN SERPL BCP-MCNC: 4.7 G/DL (ref 3.4–5)
ALP SERPL-CCNC: 87 U/L (ref 33–136)
ALT SERPL W P-5'-P-CCNC: 10 U/L (ref 7–45)
ANION GAP SERPL CALC-SCNC: 14 MMOL/L (ref 10–20)
AST SERPL W P-5'-P-CCNC: 16 U/L (ref 9–39)
BILIRUB SERPL-MCNC: 0.7 MG/DL (ref 0–1.2)
BUN SERPL-MCNC: 13 MG/DL (ref 6–23)
CALCIUM SERPL-MCNC: 11.4 MG/DL (ref 8.6–10.6)
CHLORIDE SERPL-SCNC: 103 MMOL/L (ref 98–107)
CO2 SERPL-SCNC: 29 MMOL/L (ref 21–32)
CREAT SERPL-MCNC: 0.79 MG/DL (ref 0.5–1.05)
EGFRCR SERPLBLD CKD-EPI 2021: 78 ML/MIN/1.73M*2
ERYTHROCYTE [DISTWIDTH] IN BLOOD BY AUTOMATED COUNT: 12.5 % (ref 11.5–14.5)
GLUCOSE SERPL-MCNC: 89 MG/DL (ref 74–99)
HCT VFR BLD AUTO: 54.3 % (ref 36–46)
HGB BLD-MCNC: 17.8 G/DL (ref 12–16)
MCH RBC QN AUTO: 33.1 PG (ref 26–34)
MCHC RBC AUTO-ENTMCNC: 32.8 G/DL (ref 32–36)
MCV RBC AUTO: 101 FL (ref 80–100)
NRBC BLD-RTO: 0 /100 WBCS (ref 0–0)
PLATELET # BLD AUTO: 194 X10*3/UL (ref 150–450)
POTASSIUM SERPL-SCNC: 4.3 MMOL/L (ref 3.5–5.3)
PROT SERPL-MCNC: 7.1 G/DL (ref 6.4–8.2)
PROT SERPL-MCNC: 7.1 G/DL (ref 6.4–8.2)
PTH-INTACT SERPL-MCNC: 232.2 PG/ML (ref 18.5–88)
RBC # BLD AUTO: 5.38 X10*6/UL (ref 4–5.2)
SODIUM SERPL-SCNC: 142 MMOL/L (ref 136–145)
WBC # BLD AUTO: 8.4 X10*3/UL (ref 4.4–11.3)

## 2024-05-01 PROCEDURE — 3078F DIAST BP <80 MM HG: CPT | Performed by: INTERNAL MEDICINE

## 2024-05-01 PROCEDURE — 84165 PROTEIN E-PHORESIS SERUM: CPT

## 2024-05-01 PROCEDURE — 36415 COLL VENOUS BLD VENIPUNCTURE: CPT

## 2024-05-01 PROCEDURE — 1160F RVW MEDS BY RX/DR IN RCRD: CPT | Performed by: INTERNAL MEDICINE

## 2024-05-01 PROCEDURE — 85027 COMPLETE CBC AUTOMATED: CPT

## 2024-05-01 PROCEDURE — 80053 COMPREHEN METABOLIC PANEL: CPT

## 2024-05-01 PROCEDURE — 99214 OFFICE O/P EST MOD 30 MIN: CPT | Performed by: INTERNAL MEDICINE

## 2024-05-01 PROCEDURE — 3074F SYST BP LT 130 MM HG: CPT | Performed by: INTERNAL MEDICINE

## 2024-05-01 PROCEDURE — 1159F MED LIST DOCD IN RCRD: CPT | Performed by: INTERNAL MEDICINE

## 2024-05-01 PROCEDURE — 84155 ASSAY OF PROTEIN SERUM: CPT

## 2024-05-01 PROCEDURE — 83970 ASSAY OF PARATHORMONE: CPT

## 2024-05-01 PROCEDURE — 84165 PROTEIN E-PHORESIS SERUM: CPT | Performed by: INTERNAL MEDICINE

## 2024-05-01 RX ORDER — FAMOTIDINE 10 MG/ML
20 INJECTION INTRAVENOUS ONCE AS NEEDED
OUTPATIENT
Start: 2024-05-01

## 2024-05-01 RX ORDER — EPINEPHRINE 0.3 MG/.3ML
0.3 INJECTION SUBCUTANEOUS EVERY 5 MIN PRN
OUTPATIENT
Start: 2024-05-01

## 2024-05-01 RX ORDER — DIPHENHYDRAMINE HYDROCHLORIDE 50 MG/ML
50 INJECTION INTRAMUSCULAR; INTRAVENOUS AS NEEDED
OUTPATIENT
Start: 2024-05-01

## 2024-05-01 RX ORDER — VERAPAMIL HYDROCHLORIDE 240 MG/1
240 TABLET, FILM COATED, EXTENDED RELEASE ORAL DAILY
Qty: 90 TABLET | Refills: 3 | Status: SHIPPED | OUTPATIENT
Start: 2024-05-01 | End: 2025-05-01

## 2024-05-01 RX ORDER — ALBUTEROL SULFATE 0.83 MG/ML
3 SOLUTION RESPIRATORY (INHALATION) AS NEEDED
OUTPATIENT
Start: 2024-05-01

## 2024-05-01 RX ORDER — OXYBUTYNIN CHLORIDE 15 MG/1
15 TABLET, EXTENDED RELEASE ORAL DAILY
Qty: 90 TABLET | Refills: 3 | Status: SHIPPED | OUTPATIENT
Start: 2024-05-01 | End: 2025-05-01

## 2024-05-01 RX ORDER — LOSARTAN POTASSIUM 50 MG/1
50 TABLET ORAL DAILY
Qty: 90 TABLET | Refills: 3 | Status: SHIPPED | OUTPATIENT
Start: 2024-05-01 | End: 2025-05-01

## 2024-05-01 ASSESSMENT — ENCOUNTER SYMPTOMS
PALPITATIONS: 0
FEVER: 0
POLYDIPSIA: 0
SHORTNESS OF BREATH: 0
COUGH: 0
CHILLS: 0

## 2024-05-01 NOTE — PROGRESS NOTES
"Subjective   Patient ID: Isidra Bond is a 73 y.o. female who presents for Follow-up.    73-year-old female with a history of primary hyperparathyroidism and newly diagnosed significant osteoporosis presents for routine follow-up.  Today we discussed the treatment of osteoporosis, she cannot take bisphosphonates of any type due to complications from their use and previous experiences including severe whole body swelling.  She has been trialed on both alendronate and Boniva in the past with the same reaction.  She is a T-score of -3, and is not a candidate for teriparatide given her hyperparathyroidism.  I advised her about Prolia and the risk and benefits of that medication and advised her for that being the primary choice in this situation for protecting her bone health.  I will forward with ordering by infusion and the patient will consider this option for scheduling for the future.        She is also due for follow-up on her MGUS and routine blood work which I have ordered today as well.  She can continue to see me every 6 months pending the results of her testing.    Review of Systems   Constitutional:  Negative for chills and fever.   Respiratory:  Negative for cough and shortness of breath.    Cardiovascular:  Negative for chest pain and palpitations.   Endocrine: Negative for polydipsia and polyuria.       Objective   BP (!) 105/48 (BP Location: Right arm, Patient Position: Sitting, BP Cuff Size: Adult)   Pulse 99   Temp 36.4 °C (97.6 °F) (Temporal)   Ht 1.575 m (5' 2\")   Wt (!) 42.5 kg (93 lb 9.6 oz)   BMI 17.12 kg/m²     Physical Exam  Constitutional:       Appearance: Normal appearance.   HENT:      Head: Normocephalic and atraumatic.   Eyes:      Extraocular Movements: Extraocular movements intact.      Pupils: Pupils are equal, round, and reactive to light.   Neck:      Thyroid: No thyroid mass or thyromegaly.      Vascular: No carotid bruit.   Cardiovascular:      Rate and Rhythm: Normal rate " and regular rhythm.      Heart sounds: No murmur heard.     No friction rub. No gallop.   Pulmonary:      Effort: No respiratory distress.      Breath sounds: No wheezing, rhonchi or rales.   Musculoskeletal:      Cervical back: Neck supple.      Right lower leg: No edema.      Left lower leg: No edema.   Lymphadenopathy:      Cervical: No cervical adenopathy.   Neurological:      Mental Status: She is alert.         Assessment/Plan   Problem List Items Addressed This Visit             ICD-10-CM    Acquired polycythemia D75.1    Relevant Medications    verapamil SR (Calan-SR) 240 mg ER tablet    losartan (Cozaar) 50 mg tablet    MGUS (monoclonal gammopathy of unknown significance) D47.2    Relevant Medications    verapamil SR (Calan-SR) 240 mg ER tablet    losartan (Cozaar) 50 mg tablet    Other Relevant Orders    CBC    Comprehensive Metabolic Panel    Serum Protein Electrophoresis    Urinalysis with Reflex Microscopic    PTH, intact    Hypertension I10    Relevant Medications    verapamil SR (Calan-SR) 240 mg ER tablet    losartan (Cozaar) 50 mg tablet    Other Relevant Orders    CBC    Comprehensive Metabolic Panel    Serum Protein Electrophoresis    Urinalysis with Reflex Microscopic    PTH, intact    Primary hyperparathyroidism (Multi) E21.0    Relevant Medications    verapamil SR (Calan-SR) 240 mg ER tablet    losartan (Cozaar) 50 mg tablet    Other Relevant Orders    CBC    Comprehensive Metabolic Panel    Serum Protein Electrophoresis    Urinalysis with Reflex Microscopic    PTH, intact    Irritable bladder N32.89    Relevant Medications    oxybutynin XL (Ditropan-XL) 15 mg 24 hr tablet    Localized osteoporosis without current pathological fracture - Primary M81.6

## 2024-05-01 NOTE — PATIENT INSTRUCTIONS
Consider adapting in the following way for waking at night for urination: No beverages 2 hours before bed, No caffeine 6 hours before bed.     6 months CPE.

## 2024-05-02 ENCOUNTER — CLINICAL SUPPORT (OUTPATIENT)
Dept: WOUND CARE | Facility: HOSPITAL | Age: 74
End: 2024-05-02
Payer: MEDICARE

## 2024-05-02 PROCEDURE — 99212 OFFICE O/P EST SF 10 MIN: CPT

## 2024-05-03 ENCOUNTER — OFFICE VISIT (OUTPATIENT)
Dept: CARDIOLOGY | Facility: HOSPITAL | Age: 74
End: 2024-05-03
Payer: MEDICARE

## 2024-05-03 VITALS — SYSTOLIC BLOOD PRESSURE: 121 MMHG | OXYGEN SATURATION: 99 % | HEART RATE: 84 BPM | DIASTOLIC BLOOD PRESSURE: 67 MMHG

## 2024-05-03 DIAGNOSIS — I73.9 PERIPHERAL ARTERIAL DISEASE (CMS-HCC): Primary | ICD-10-CM

## 2024-05-03 PROCEDURE — 1160F RVW MEDS BY RX/DR IN RCRD: CPT | Performed by: INTERNAL MEDICINE

## 2024-05-03 PROCEDURE — 3074F SYST BP LT 130 MM HG: CPT | Performed by: INTERNAL MEDICINE

## 2024-05-03 PROCEDURE — 99213 OFFICE O/P EST LOW 20 MIN: CPT | Performed by: INTERNAL MEDICINE

## 2024-05-03 PROCEDURE — 3078F DIAST BP <80 MM HG: CPT | Performed by: INTERNAL MEDICINE

## 2024-05-03 PROCEDURE — 1159F MED LIST DOCD IN RCRD: CPT | Performed by: INTERNAL MEDICINE

## 2024-05-03 RX ORDER — ATORVASTATIN CALCIUM 40 MG/1
40 TABLET, FILM COATED ORAL NIGHTLY
Qty: 90 TABLET | Refills: 3 | Status: SHIPPED | OUTPATIENT
Start: 2024-05-03 | End: 2025-05-03

## 2024-05-03 NOTE — PROGRESS NOTES
Primary Care Physician: Stanislav Brooks MD   Date of Visit: 05/03/2024 10:00 AM EDT  Type of Visit: New Patient      Chief Complaint:  No chief complaint on file.       HPI  Isidra Bond 73 y.o. female who presents establish care.    Overall doing well, right lower leg wound has healed and she has been discharged from the wound clinic.  Continues to have right calf claudication (fatigue/pain) that occurs on a roundtrip to her mailbox, (approximately 150 feet).    Review of Systems   12 point review of systems was obtained in detail and is negative other than that noted above or below.       Past Medical/Surgical History:  Isidra has a past medical history of Bitten by cat, initial encounter (11/28/2016), Encounter for gynecological examination (general) (routine) without abnormal findings (03/28/2017), Encounter for screening for malignant neoplasm of cervix (02/23/2015), Pelvic and perineal pain (03/19/2018), Personal history of other diseases of the female genital tract (09/14/2020), Personal history of other diseases of the nervous system and sense organs (06/06/2013), Personal history of other endocrine, nutritional and metabolic disease (03/28/2017), Personal history of other specified conditions (06/05/2017), Pleurodynia (02/09/2021), Rash and other nonspecific skin eruption (04/01/2016), Unspecified abnormal findings in urine, and Urinary tract infection, site not specified (09/05/2019).    Isidra has a past surgical history that includes Other surgical history (05/05/2022); Tubal ligation (02/24/2016); Other surgical history (02/24/2016); Mouth surgery (02/24/2016); Neck surgery (02/24/2016); Tonsillectomy (02/24/2016); and Colonoscopy (09/14/2020).    Social/Family History:  Isidra reports that she has been smoking cigarettes. She has never used smokeless tobacco. She reports current alcohol use of about 1.0 standard drink of alcohol per week. She reports that she does not use drugs.    Isidra's  family history includes Alzheimer's disease in an other family member; Parkinsonism in an other family member; Peripheral vascular disease in an other family member.    Allergies:  Allergies   Allergen Reactions    Alendronate Swelling    Boniva [Ibandronate] Swelling    Penicillins Rash       Medications:  Current Outpatient Medications on File Prior to Visit   Medication Sig    latanoprost (Xalatan) 0.005 % ophthalmic solution     losartan (Cozaar) 50 mg tablet Take 1 tablet (50 mg) by mouth once daily.    oxybutynin XL (Ditropan-XL) 15 mg 24 hr tablet Take 1 tablet (15 mg) by mouth once daily.    verapamil SR (Calan-SR) 240 mg ER tablet Take 1 tablet (240 mg) by mouth once daily.    [DISCONTINUED] losartan (Cozaar) 50 mg tablet Take 1 tablet (50 mg) by mouth once daily.    [DISCONTINUED] oxybutynin XL (Ditropan-XL) 15 mg 24 hr tablet Take 1 tablet (15 mg) by mouth once daily.    [DISCONTINUED] verapamil SR (Calan-SR) 240 mg ER tablet Take 1 tablet (240 mg) by mouth once daily.     No current facility-administered medications on file prior to visit.       Objective:   Vitals:    05/03/24 1015   BP: 121/67   Pulse: 84   SpO2: 99%       S1-S2 normal, regular rate and rhythm, normal rubs or gallops  Clear to auscultation bilaterally    Labs and Imaging:      Latest Ref Rng & Units 9/14/2021     8:33 AM 2/23/2022     6:23 PM 2/24/2022     7:31 AM 2/24/2022    12:23 PM 5/30/2023     9:19 AM 10/31/2023     8:59 AM 5/1/2024     8:56 AM   Electrolytes   Na 136 - 145 mmol/L 144  139  145  138  143  143  142    K 3.5 - 5.3 mmol/L 3.8  4.6  2.3  4.7  4.7  4.5  4.3    Cl 98 - 107 mmol/L 104  102  121  105  108  107  103    CO2 21 - 32 mmol/L 30  28  17  28  28  26  29    Cr 0.50 - 1.05 mg/dL 0.77  0.69  0.25  0.58  0.71  0.75  0.79    Ca 8.6 - 10.6 mg/dL 11.5  11.0  5.1  9.9  11.5  11.0  11.4    Phos 2.5 - 4.9 mg/dL 2.9                Latest Ref Rng & Units 6/7/2021     4:48 AM 6/8/2021    10:02 AM 2/23/2022     6:23 PM  2/24/2022     5:44 AM 2/24/2022    12:23 PM 5/30/2023     9:19 AM 5/1/2024     8:56 AM   CBC   Hb 12.0 - 16.0 g/dL 14.4  15.5  17.2  13.7  16.0  17.4  17.8    Hct 36.0 - 46.0 % 44.0  46.4  51.0  42.2  47.7  53.4  54.3    MCV 80 - 100 fL 106  105  104  108  103  103  101    RDW 11.5 - 14.5 % 12.1  12.4  13.6  13.3  13.4  12.8  12.5          Latest Ref Rng & Units 9/5/2019     9:36 AM 9/14/2020     9:40 AM 6/5/2021    10:31 PM 6/8/2021    10:02 AM 2/23/2022     6:23 PM 5/30/2023     9:19 AM 5/1/2024     8:56 AM   Lipids   Chol 0 - 199 mg/dL 219  221         HDL mg/dL 74.6  74.7         LDL 0 - 99 mg/dL 125  130         Trig 0 - 149 mg/dL 98  83         ALT 7 - 45 U/L 11  12  15  17  18  10  10    AST 9 - 39 U/L 14  14  13  16  22  15  16          Latest Ref Rng & Units 5/10/2018    12:00 AM 9/5/2019     9:36 AM 9/14/2020     9:40 AM 5/5/2022    11:08 AM   Thyroid   TSH 0.44 - 3.98 mIU/L 1.39  1.33  1.33  1.40           No data to display                 Lab Results   Component Value Date    HGBA1C 5.1 05/30/2023        The ASCVD Risk score (Rachael ZAMORA, et al., 2019) failed to calculate for the following reasons:    Cannot find a previous HDL lab    Cannot find a previous total cholesterol lab     Echocardiogram: No results found for this or any previous visit.     Echocardiogram: No results found for this or any previous visit from the past 1825 days.    Stress Testing: No results found for this or any previous visit from the past 1825 days.    Cardiac Catheterization: No results found for this or any previous visit from the past 1825 days.    Cardiac Scoring: No results found for this or any previous visit from the past 1825 days.    AAA : No results found for this or any previous visit from the past 1825 days.    OTHER: No results found for this or any previous visit from the past 1825 days.        Assessment/Plan:   1. Peripheral arterial disease (CMS-HCC)  atorvastatin (Lipitor) 40 mg tablet           Isidra BROWN  Ronda 73 y.o. female who presents for follow-up:    1.  Peripheral arterial disease    Overall doing well with adequate wound healing.  Start atorvastatin 40 mg nightly given peripheral arterial disease.  She can follow-up in 1 year.    Time Spent: I spent 20 minutes reviewing medical testing, obtaining medical history and counselling and educating on diagnosis and documenting clinical encounter.         ____________________________________________________________  Mikel Wilburn MD

## 2024-05-04 LAB
ALBUMIN: 4.7 G/DL (ref 3.4–5)
ALPHA 1 GLOBULIN: 0.4 G/DL (ref 0.2–0.6)
ALPHA 2 GLOBULIN: 0.8 G/DL (ref 0.4–1.1)
BETA GLOBULIN: 0.7 G/DL (ref 0.5–1.2)
GAMMA GLOBULIN: 0.5 G/DL (ref 0.5–1.4)
M-PROTEIN 1: 0.1 G/DL
PATH REVIEW-SERUM PROTEIN ELECTROPHORESIS: ABNORMAL
PROTEIN ELECTROPHORESIS COMMENT: ABNORMAL

## 2024-05-13 DIAGNOSIS — M81.6 LOCALIZED OSTEOPOROSIS WITHOUT CURRENT PATHOLOGICAL FRACTURE: Primary | ICD-10-CM

## 2024-05-14 NOTE — RESULT ENCOUNTER NOTE
Great, therapy plan has been ordered and signed off on patient can schedule with the infusion center

## 2024-05-16 NOTE — RESULT ENCOUNTER NOTE
She can schedule the first Prolia injection for a later time if she wants to try on the medicine first.

## 2024-05-21 ENCOUNTER — OFFICE VISIT (OUTPATIENT)
Dept: ORTHOPEDIC SURGERY | Facility: CLINIC | Age: 74
End: 2024-05-21
Payer: COMMERCIAL

## 2024-05-21 ENCOUNTER — HOSPITAL ENCOUNTER (OUTPATIENT)
Dept: RADIOLOGY | Facility: HOSPITAL | Age: 74
Discharge: HOME | End: 2024-05-21
Payer: MEDICARE

## 2024-05-21 DIAGNOSIS — G89.29 CHRONIC PAIN OF LEFT KNEE: ICD-10-CM

## 2024-05-21 DIAGNOSIS — M25.552 LEFT HIP PAIN: ICD-10-CM

## 2024-05-21 DIAGNOSIS — M25.551 RIGHT HIP PAIN: ICD-10-CM

## 2024-05-21 DIAGNOSIS — M25.551 RIGHT HIP PAIN: Primary | ICD-10-CM

## 2024-05-21 DIAGNOSIS — M25.562 CHRONIC PAIN OF LEFT KNEE: ICD-10-CM

## 2024-05-21 PROCEDURE — 73502 X-RAY EXAM HIP UNI 2-3 VIEWS: CPT | Mod: RT

## 2024-05-21 PROCEDURE — 99213 OFFICE O/P EST LOW 20 MIN: CPT | Performed by: ORTHOPAEDIC SURGERY

## 2024-05-21 PROCEDURE — 73502 X-RAY EXAM HIP UNI 2-3 VIEWS: CPT | Mod: RIGHT SIDE | Performed by: RADIOLOGY

## 2024-05-21 PROCEDURE — 1160F RVW MEDS BY RX/DR IN RCRD: CPT | Performed by: ORTHOPAEDIC SURGERY

## 2024-05-21 PROCEDURE — 1159F MED LIST DOCD IN RCRD: CPT | Performed by: ORTHOPAEDIC SURGERY

## 2024-05-21 NOTE — PROGRESS NOTES
This is a consultation from Dr. Stanislav Brooks MD for No chief complaint on file.      This is a 73 y.o. female who presents for evaluation of her right hip and right knee.  Patient had right hip hemiarthroplasty for fracture in the past, she is here for radiographic follow-up on that.  No issues there.  It is feeling good she has had no drainage.  She does have pain in her right knee, she had a tibial plateau fracture treated nonsurgically several years ago.  Pain over the lateral knee worse with walking proving with rest.  Exacerbated recently.    Physical Exam    There has been no interval change in this patient's past medical, surgical, medications, allergies, family history or social history since the most recent visit to a provider within our department. 14 point review of systems was performed, reviewed, and negative except for pertinent positives documented in the history of present illness.     Constitutional: well developed, well nourished female in no acute distress  Psychiatric: normal mood, appropriate affect  Eyes: sclera anicteric  HENT: normocephalic/atraumatic  CV: regular rate and rhythm   Respiratory: non labored breathing  Integumentary: no rash  Neurological: moves all extremities    Right knee exam: skin intact no lacerations or abrations. no effusion.  Tender lateral joint line. negative log roll negative patellar grind. ROM 0-120. stable to varus and valgus stress at 0 and 30 degrees. negative lachman negative posterior drawer negative liat. 5/5 ehl/fhl/gs/ta. silt s/s/sp/dp/t. 2+ dp/pt    Right hip examination: Well-healed surgical incision erythema no drainage, no pain with range of motion, neurovascular tact distally  Xrays were ordered by me, they were reviewed and independently interpreted by me today, they show stable right hip hemiarthroplasty no fracture dislocation or loosening    Procedures      Impression/Plan: This is a 73 y.o. female with posttraumatic right knee arthritis.  " I had an in depth discussion with the patient regarding treatment options for arthritis and their relative risks and benefits. We reviewed surgical and nonsurgical option for treatment. Treatments include anti inflammatory medications, physical therapy, weight loss, activity modification, use of assistive devices, injection therapies. We discussed current prescriptions and risks and benefits of continuation of prescription medication as apporpriate. We discussed that arthritis is often progressive over time, an in end stage arthritis surgical interventions can be considered, including arthroplasty. All questions were answered and the patient voiced their understanding.  Right hip is doing well, plan for radiographic follow-up in the future    BMI Readings from Last 1 Encounters:   05/01/24 17.12 kg/m²      Lab Results   Component Value Date    CREATININE 0.79 05/01/2024     Tobacco Use: High Risk (5/21/2024)    Patient History     Smoking Tobacco Use: Every Day     Smokeless Tobacco Use: Never     Passive Exposure: Not on file      Computed MELD 3.0 unavailable. One or more values for this score either were not found within the given timeframe or did not fit some other criterion.  Computed MELD-Na unavailable. One or more values for this score either were not found within the given timeframe or did not fit some other criterion.       Lab Results   Component Value Date    HGBA1C 5.1 05/30/2023     No results found for: \"STAPHMRSASCR\"  "

## 2024-05-28 ENCOUNTER — TELEPHONE (OUTPATIENT)
Dept: ORTHOPEDIC SURGERY | Facility: CLINIC | Age: 74
End: 2024-05-28
Payer: COMMERCIAL

## 2024-05-28 NOTE — TELEPHONE ENCOUNTER
Isidra called checking on her paperwork for [employer]. I didn't see anything scanned in for her, I also let her know paperwork can take 7-10 business days. She was seen on the 21st. She would like a call when that gets faxed over.

## 2024-07-08 ENCOUNTER — TELEPHONE (OUTPATIENT)
Dept: CARDIOLOGY | Facility: HOSPITAL | Age: 74
End: 2024-07-08
Payer: COMMERCIAL

## 2024-07-23 ENCOUNTER — TELEPHONE (OUTPATIENT)
Dept: CARDIOLOGY | Facility: HOSPITAL | Age: 74
End: 2024-07-23
Payer: COMMERCIAL

## 2024-08-17 DIAGNOSIS — I73.9 PAD (PERIPHERAL ARTERY DISEASE) (CMS-HCC): Primary | ICD-10-CM

## 2024-08-17 RX ORDER — ROSUVASTATIN CALCIUM 20 MG/1
20 TABLET, COATED ORAL DAILY
Qty: 90 EACH | Refills: 3 | Status: SHIPPED | OUTPATIENT
Start: 2024-08-17 | End: 2025-08-17

## 2024-09-06 ENCOUNTER — TELEPHONE (OUTPATIENT)
Dept: CARDIOLOGY | Facility: HOSPITAL | Age: 74
End: 2024-09-06
Payer: COMMERCIAL

## 2024-11-01 ENCOUNTER — LAB (OUTPATIENT)
Dept: LAB | Facility: LAB | Age: 74
End: 2024-11-01
Payer: MEDICARE

## 2024-11-01 ENCOUNTER — APPOINTMENT (OUTPATIENT)
Dept: PRIMARY CARE | Facility: CLINIC | Age: 74
End: 2024-11-01
Payer: COMMERCIAL

## 2024-11-01 VITALS
WEIGHT: 88 LBS | HEART RATE: 112 BPM | TEMPERATURE: 98.2 F | DIASTOLIC BLOOD PRESSURE: 59 MMHG | SYSTOLIC BLOOD PRESSURE: 137 MMHG | BODY MASS INDEX: 16.1 KG/M2

## 2024-11-01 DIAGNOSIS — D47.2 MGUS (MONOCLONAL GAMMOPATHY OF UNKNOWN SIGNIFICANCE): Primary | ICD-10-CM

## 2024-11-01 DIAGNOSIS — M81.6 LOCALIZED OSTEOPOROSIS WITHOUT CURRENT PATHOLOGICAL FRACTURE: ICD-10-CM

## 2024-11-01 DIAGNOSIS — D47.2 MGUS (MONOCLONAL GAMMOPATHY OF UNKNOWN SIGNIFICANCE): ICD-10-CM

## 2024-11-01 DIAGNOSIS — E21.3 HYPERPARATHYROIDISM (MULTI): ICD-10-CM

## 2024-11-01 DIAGNOSIS — E83.52 HYPERCALCEMIA: ICD-10-CM

## 2024-11-01 LAB
25(OH)D3 SERPL-MCNC: 72 NG/ML (ref 30–100)
ALBUMIN SERPL BCP-MCNC: 4.4 G/DL (ref 3.4–5)
ALP SERPL-CCNC: 84 U/L (ref 33–136)
ALT SERPL W P-5'-P-CCNC: 10 U/L (ref 7–45)
ANION GAP SERPL CALC-SCNC: 10 MMOL/L (ref 10–20)
AST SERPL W P-5'-P-CCNC: 14 U/L (ref 9–39)
BASOPHILS # BLD AUTO: 0.03 X10*3/UL (ref 0–0.1)
BASOPHILS NFR BLD AUTO: 0.4 %
BILIRUB SERPL-MCNC: 0.7 MG/DL (ref 0–1.2)
BUN SERPL-MCNC: 10 MG/DL (ref 6–23)
CALCIUM SERPL-MCNC: 11.3 MG/DL (ref 8.6–10.3)
CHLORIDE SERPL-SCNC: 107 MMOL/L (ref 98–107)
CO2 SERPL-SCNC: 29 MMOL/L (ref 21–32)
CREAT SERPL-MCNC: 0.76 MG/DL (ref 0.5–1.05)
EGFRCR SERPLBLD CKD-EPI 2021: 81 ML/MIN/1.73M*2
EOSINOPHIL # BLD AUTO: 0.06 X10*3/UL (ref 0–0.4)
EOSINOPHIL NFR BLD AUTO: 0.8 %
ERYTHROCYTE [DISTWIDTH] IN BLOOD BY AUTOMATED COUNT: 12.9 % (ref 11.5–14.5)
GLUCOSE SERPL-MCNC: 96 MG/DL (ref 74–99)
HCT VFR BLD AUTO: 52.2 % (ref 36–46)
HGB BLD-MCNC: 17 G/DL (ref 12–16)
IMM GRANULOCYTES # BLD AUTO: 0.02 X10*3/UL (ref 0–0.5)
IMM GRANULOCYTES NFR BLD AUTO: 0.3 % (ref 0–0.9)
LYMPHOCYTES # BLD AUTO: 1.55 X10*3/UL (ref 0.8–3)
LYMPHOCYTES NFR BLD AUTO: 20.5 %
MCH RBC QN AUTO: 33.5 PG (ref 26–34)
MCHC RBC AUTO-ENTMCNC: 32.6 G/DL (ref 32–36)
MCV RBC AUTO: 103 FL (ref 80–100)
MONOCYTES # BLD AUTO: 0.3 X10*3/UL (ref 0.05–0.8)
MONOCYTES NFR BLD AUTO: 4 %
NEUTROPHILS # BLD AUTO: 5.59 X10*3/UL (ref 1.6–5.5)
NEUTROPHILS NFR BLD AUTO: 74 %
NRBC BLD-RTO: 0 /100 WBCS (ref 0–0)
PLATELET # BLD AUTO: 181 X10*3/UL (ref 150–450)
POTASSIUM SERPL-SCNC: 4.8 MMOL/L (ref 3.5–5.3)
PROT SERPL-MCNC: 6.2 G/DL (ref 6.4–8.2)
PROT SERPL-MCNC: 6.7 G/DL (ref 6.4–8.2)
RBC # BLD AUTO: 5.07 X10*6/UL (ref 4–5.2)
SODIUM SERPL-SCNC: 141 MMOL/L (ref 136–145)
WBC # BLD AUTO: 7.6 X10*3/UL (ref 4.4–11.3)

## 2024-11-01 PROCEDURE — 4004F PT TOBACCO SCREEN RCVD TLK: CPT | Performed by: INTERNAL MEDICINE

## 2024-11-01 PROCEDURE — 99214 OFFICE O/P EST MOD 30 MIN: CPT | Performed by: INTERNAL MEDICINE

## 2024-11-01 PROCEDURE — 1126F AMNT PAIN NOTED NONE PRSNT: CPT | Performed by: INTERNAL MEDICINE

## 2024-11-01 PROCEDURE — G2211 COMPLEX E/M VISIT ADD ON: HCPCS | Performed by: INTERNAL MEDICINE

## 2024-11-01 PROCEDURE — 80053 COMPREHEN METABOLIC PANEL: CPT

## 2024-11-01 PROCEDURE — 84155 ASSAY OF PROTEIN SERUM: CPT

## 2024-11-01 PROCEDURE — 82306 VITAMIN D 25 HYDROXY: CPT

## 2024-11-01 PROCEDURE — 3075F SYST BP GE 130 - 139MM HG: CPT | Performed by: INTERNAL MEDICINE

## 2024-11-01 PROCEDURE — 84165 PROTEIN E-PHORESIS SERUM: CPT

## 2024-11-01 PROCEDURE — 85025 COMPLETE CBC W/AUTO DIFF WBC: CPT

## 2024-11-01 PROCEDURE — 3078F DIAST BP <80 MM HG: CPT | Performed by: INTERNAL MEDICINE

## 2024-11-01 ASSESSMENT — ENCOUNTER SYMPTOMS
FEVER: 0
COUGH: 0
POLYDIPSIA: 0
CHILLS: 0
SHORTNESS OF BREATH: 0
PALPITATIONS: 0

## 2024-11-01 ASSESSMENT — PAIN SCALES - GENERAL: PAINLEVEL_OUTOF10: 0-NO PAIN

## 2024-11-05 LAB
ALBUMIN: 4.5 G/DL (ref 3.4–5)
ALPHA 1 GLOBULIN: 0.3 G/DL (ref 0.2–0.6)
ALPHA 2 GLOBULIN: 0.8 G/DL (ref 0.4–1.1)
BETA GLOBULIN: 0.6 G/DL (ref 0.5–1.2)
GAMMA GLOBULIN: 0.5 G/DL (ref 0.5–1.4)
M-PROTEIN 1: 0.1 G/DL
PATH REVIEW-SERUM PROTEIN ELECTROPHORESIS: ABNORMAL
PROTEIN ELECTROPHORESIS COMMENT: ABNORMAL

## 2025-03-04 ENCOUNTER — APPOINTMENT (OUTPATIENT)
Dept: PRIMARY CARE | Facility: CLINIC | Age: 75
End: 2025-03-04
Payer: COMMERCIAL

## 2025-03-04 VITALS
HEART RATE: 108 BPM | TEMPERATURE: 97.6 F | BODY MASS INDEX: 16.61 KG/M2 | SYSTOLIC BLOOD PRESSURE: 142 MMHG | WEIGHT: 90.8 LBS | DIASTOLIC BLOOD PRESSURE: 77 MMHG

## 2025-03-04 DIAGNOSIS — D47.2 MGUS (MONOCLONAL GAMMOPATHY OF UNKNOWN SIGNIFICANCE): ICD-10-CM

## 2025-03-04 DIAGNOSIS — J43.9 PULMONARY EMPHYSEMA, UNSPECIFIED EMPHYSEMA TYPE (MULTI): Primary | ICD-10-CM

## 2025-03-04 DIAGNOSIS — E83.52 HYPERCALCEMIA: ICD-10-CM

## 2025-03-04 DIAGNOSIS — E21.3 HYPERPARATHYROIDISM (MULTI): ICD-10-CM

## 2025-03-04 PROCEDURE — 1159F MED LIST DOCD IN RCRD: CPT | Performed by: INTERNAL MEDICINE

## 2025-03-04 PROCEDURE — 3077F SYST BP >= 140 MM HG: CPT | Performed by: INTERNAL MEDICINE

## 2025-03-04 PROCEDURE — 99214 OFFICE O/P EST MOD 30 MIN: CPT | Performed by: INTERNAL MEDICINE

## 2025-03-04 PROCEDURE — G2211 COMPLEX E/M VISIT ADD ON: HCPCS | Performed by: INTERNAL MEDICINE

## 2025-03-04 PROCEDURE — 1160F RVW MEDS BY RX/DR IN RCRD: CPT | Performed by: INTERNAL MEDICINE

## 2025-03-04 PROCEDURE — 3078F DIAST BP <80 MM HG: CPT | Performed by: INTERNAL MEDICINE

## 2025-03-04 RX ORDER — FLUTICASONE FUROATE, UMECLIDINIUM BROMIDE AND VILANTEROL TRIFENATATE 100; 62.5; 25 UG/1; UG/1; UG/1
1 POWDER RESPIRATORY (INHALATION) DAILY
Qty: 14 EACH | Refills: 0 | COMMUNITY
Start: 2025-03-04

## 2025-03-04 ASSESSMENT — ENCOUNTER SYMPTOMS
FEVER: 0
COUGH: 0
CHILLS: 0
PALPITATIONS: 0
POLYDIPSIA: 0
SHORTNESS OF BREATH: 0

## 2025-03-04 NOTE — PROGRESS NOTES
Subjective   Patient ID: Isidra Bond is a 74 y.o. female who presents for Follow-up.    74-year-old female presents today for routine follow-up.  She has a history of longstanding hyperparathyroidism, advanced osteoporosis as a consequence of hyperparathyroidism among other potential risk factors such as low BMI  female.  She has been trialed on multiple bisphosphonates without tolerance she is refusing further bisphosphonate therapy.  We discussed Prolia as an alternative therapy, she has been resistant to starting it despite multiple discussions and education about the medication and its benefits.  She understands that she continues to risk fragility fracture and even the possibility of requirements of treating her hyperparathyroid condition surgically if she does not lower her calcium levels or develops additional complications.  She has been counseled on the some multiple occasions now but is still hesitant to start any new medications and is considering these recommendations at this time.  She has no new concerns at this time.  No foamy urine, no headaches, no new falls or injuries, no bruising or bleeding.  Her labs for follow-up are ordered at this time she will see me again in 4 months.    Labs ordered for follow up.   Otherwise clinically stable.   No COPD exacerbations, again counseled on medication options, agreeable to trial. No exacerbations.          Review of Systems   Constitutional:  Negative for chills and fever.   Respiratory:  Negative for cough and shortness of breath.    Cardiovascular:  Negative for chest pain and palpitations.   Endocrine: Negative for polydipsia and polyuria.       Objective   /77   Pulse 108   Temp 36.4 °C (97.6 °F) (Temporal)   Wt (!) 41.2 kg (90 lb 12.8 oz)   BMI 16.61 kg/m²     Physical Exam  Constitutional:       Appearance: Normal appearance.   HENT:      Head: Normocephalic and atraumatic.   Eyes:      Extraocular Movements: Extraocular  movements intact.      Pupils: Pupils are equal, round, and reactive to light.   Neck:      Thyroid: No thyroid mass or thyromegaly.   Cardiovascular:      Rate and Rhythm: Normal rate and regular rhythm.      Heart sounds: No murmur heard.     No friction rub. No gallop.   Pulmonary:      Effort: No respiratory distress.      Breath sounds: No wheezing, rhonchi or rales.   Musculoskeletal:      Cervical back: Neck supple.      Right lower leg: No edema.      Left lower leg: No edema.   Neurological:      Mental Status: She is alert.         Assessment/Plan   Assessment & Plan  Pulmonary emphysema, unspecified emphysema type (Multi)    Orders:    Referral to Clinical Pharmacy; Future    MGUS (monoclonal gammopathy of unknown significance)    Orders:    fluticasone-umeclidin-vilanter (Trelegy Ellipta) 100-62.5-25 mcg blister with device; Inhale 1 puff once daily.    Serum Protein Electrophoresis; Future    CBC and Auto Differential; Future    Comprehensive Metabolic Panel; Future    Hyperparathyroidism (Multi)    Orders:    fluticasone-umeclidin-vilanter (Trelegy Ellipta) 100-62.5-25 mcg blister with device; Inhale 1 puff once daily.    Serum Protein Electrophoresis; Future    CBC and Auto Differential; Future    Comprehensive Metabolic Panel; Future    Hypercalcemia    Orders:    fluticasone-umeclidin-vilanter (Trelegy Ellipta) 100-62.5-25 mcg blister with device; Inhale 1 puff once daily.    Serum Protein Electrophoresis; Future    CBC and Auto Differential; Future    Comprehensive Metabolic Panel; Future

## 2025-03-04 NOTE — ASSESSMENT & PLAN NOTE
Orders:    fluticasone-umeclidin-vilanter (Trelegy Ellipta) 100-62.5-25 mcg blister with device; Inhale 1 puff once daily.    Serum Protein Electrophoresis; Future    CBC and Auto Differential; Future    Comprehensive Metabolic Panel; Future

## 2025-03-14 ENCOUNTER — APPOINTMENT (OUTPATIENT)
Dept: PHARMACY | Facility: HOSPITAL | Age: 75
End: 2025-03-14
Payer: COMMERCIAL

## 2025-03-14 DIAGNOSIS — J43.9 PULMONARY EMPHYSEMA, UNSPECIFIED EMPHYSEMA TYPE (MULTI): ICD-10-CM

## 2025-03-14 NOTE — PROGRESS NOTES
Clinical Pharmacy Appointment    Patient ID: Isidra Bond is a 74 y.o. female who presents for COPD.    Pt is here for First appointment.     Referring Provider: Stanislav Brooks MD  PCP: Stanislav Brooks MD   Last visit with PCP: 3/4/25   Next visit with PCP: 7/8/25    Subjective   HPI    Patient is a 74 y.o. female who presents to an initial clinical pharmacy visit for COPD. Patient stated she does not have COPD and has no trouble breathing. Reported she was started on Trelegy for lung protection after years of being a smoker. Denied any issues with the medication. Correct inhaler technique described. Stated she feels no different with the inhaler and does not want to continue taking it.     Drug Interactions  No relevant drug interactions were noted.    Medication System Management  Patient's preferred pharmacy: [facility]  Affordability/Accessibility: Trelegy $473/month on insurance due to deductible. Discussed [facility] PAP which the patient would qualify for.       Objective   Allergies   Allergen Reactions    Alendronate Swelling    Atorvastatin Other     Muscle weakness    Boniva [Ibandronate] Swelling    Statins-Hmg-Coa Reductase Inhibitors Myalgia    Penicillins Rash     Social History     Social History Narrative    Not on file      Medication Review  Current Outpatient Medications   Medication Instructions    fluticasone-umeclidin-vilanter (Trelegy Ellipta) 100-62.5-25 mcg blister with device 1 puff, inhalation, Daily    latanoprost (Xalatan) 0.005 % ophthalmic solution Administer 1 drop into both eyes once daily at bedtime.    losartan (COZAAR) 50 mg, oral, Daily    oxybutynin XL (DITROPAN-XL) 15 mg, oral, Daily    verapamil SR (CALAN-SR) 240 mg, oral, Daily      Vitals  BP Readings from Last 2 Encounters:   03/04/25 142/77   11/01/24 137/59     BMI Readings from Last 1 Encounters:   03/04/25 16.61 kg/m²      Labs  A1C  Lab Results   Component Value Date    HGBA1C 5.1 05/30/2023  "    BMP  Lab Results   Component Value Date    CALCIUM 11.3 (H) 11/01/2024     11/01/2024    K 4.8 11/01/2024    CO2 29 11/01/2024     11/01/2024    BUN 10 11/01/2024    CREATININE 0.76 11/01/2024    EGFR 81 11/01/2024     LFTs  Lab Results   Component Value Date    ALT 10 11/01/2024    AST 14 11/01/2024    ALKPHOS 84 11/01/2024    BILITOT 0.7 11/01/2024     FLP  Lab Results   Component Value Date    TRIG 83 09/14/2020    CHOL 221 (H) 09/14/2020    LDLF 130 (H) 09/14/2020    HDL 74.7 09/14/2020     Urine Microalbumin  No results found for: \"MICROALBCREA\"  Weight Management  Wt Readings from Last 3 Encounters:   03/04/25 (!) 41.2 kg (90 lb 12.8 oz)   11/01/24 (!) 39.9 kg (88 lb)   05/01/24 (!) 42.5 kg (93 lb 9.6 oz)      There is no height or weight on file to calculate BMI.     Assessment/Plan   Problem List Items Addressed This Visit       COPD (chronic obstructive pulmonary disease) (Multi)   Discussed benefits of Trelegy inhaler. Patient denied breathing issues and stated she feels no different with inhaler. She is currently using a sample of the medication and would like to discuss with PCP further on if this medication is necessary given asymptomatic. Patient would qualify for  PAP if she chooses to stay on the medication.   CONTINUE Trelegy 100-62.5-25 mcg inhaling 1 puff daily  CONTINUE making healthy lifestyle choices  Provided patient with [facility] phone number for any additional questions or concerns. Instructed patient to reach back out to [facility] if she wants to move forward with  PAP. [phone]    Follow up with Clinical Pharmacy Team as needed by patient or PCP    Time spent with pt: Total length of time 20 (minutes) of the encounter and more than 50% was spent counseling the patient.    Continue all meds under the continuation of care with the referring provider and clinical pharmacy team.    Please reach out to the Clinical Pharmacy Team if there are any further questions.     Verbal " consent to manage patient's drug therapy was obtained from patient. They were informed they may decline to participate or withdraw from participation in pharmacy services at any time.    Beryl Corbin, PharmD  Clinical Pharmacy Specialist   [phone]

## 2025-03-21 ENCOUNTER — LAB (OUTPATIENT)
Dept: LAB | Facility: HOSPITAL | Age: 75
End: 2025-03-21
Payer: COMMERCIAL

## 2025-03-21 LAB
ALBUMIN SERPL-MCNC: 4.9 G/DL (ref 3.6–5.1)
ALP SERPL-CCNC: 104 U/L (ref 37–153)
ALT SERPL-CCNC: 10 U/L (ref 6–29)
ANION GAP SERPL CALCULATED.4IONS-SCNC: 9 MMOL/L (CALC) (ref 7–17)
AST SERPL-CCNC: 16 U/L (ref 10–35)
BASOPHILS # BLD AUTO: 29 CELLS/UL (ref 0–200)
BASOPHILS NFR BLD AUTO: 0.5 %
BILIRUB SERPL-MCNC: 0.7 MG/DL (ref 0.2–1.2)
BUN SERPL-MCNC: 12 MG/DL (ref 7–25)
CALCIUM SERPL-MCNC: 11.4 MG/DL (ref 8.6–10.4)
CHLORIDE SERPL-SCNC: 103 MMOL/L (ref 98–110)
CO2 SERPL-SCNC: 28 MMOL/L (ref 20–32)
CREAT SERPL-MCNC: 0.78 MG/DL (ref 0.6–1)
EGFRCR SERPLBLD CKD-EPI 2021: 79 ML/MIN/1.73M2
EOSINOPHIL # BLD AUTO: 58 CELLS/UL (ref 15–500)
EOSINOPHIL NFR BLD AUTO: 1 %
ERYTHROCYTE [DISTWIDTH] IN BLOOD BY AUTOMATED COUNT: 11.7 % (ref 11–15)
GLUCOSE SERPL-MCNC: 92 MG/DL (ref 65–99)
HCT VFR BLD AUTO: 52.2 % (ref 35–45)
HGB BLD-MCNC: 18.6 G/DL (ref 11.7–15.5)
LYMPHOCYTES # BLD AUTO: 1206 CELLS/UL (ref 850–3900)
LYMPHOCYTES NFR BLD AUTO: 20.8 %
MCH RBC QN AUTO: 35 PG (ref 27–33)
MCHC RBC AUTO-ENTMCNC: 35.6 G/DL (ref 32–36)
MCV RBC AUTO: 98.3 FL (ref 80–100)
MONOCYTES # BLD AUTO: 278 CELLS/UL (ref 200–950)
MONOCYTES NFR BLD AUTO: 4.8 %
NEUTROPHILS # BLD AUTO: 4228 CELLS/UL (ref 1500–7800)
NEUTROPHILS NFR BLD AUTO: 72.9 %
PLATELET # BLD AUTO: 177 THOUSAND/UL (ref 140–400)
PMV BLD REES-ECKER: 10 FL (ref 7.5–12.5)
POTASSIUM SERPL-SCNC: 4.2 MMOL/L (ref 3.5–5.3)
PROT SERPL-MCNC: 7.1 G/DL (ref 6.1–8.1)
RBC # BLD AUTO: 5.31 MILLION/UL (ref 3.8–5.1)
SODIUM SERPL-SCNC: 140 MMOL/L (ref 135–146)
WBC # BLD AUTO: 5.8 THOUSAND/UL (ref 3.8–10.8)

## 2025-03-21 PROCEDURE — 84155 ASSAY OF PROTEIN SERUM: CPT

## 2025-03-21 PROCEDURE — 82232 ASSAY OF BETA-2 PROTEIN: CPT

## 2025-03-21 PROCEDURE — 83521 IG LIGHT CHAINS FREE EACH: CPT

## 2025-03-21 PROCEDURE — 84165 PROTEIN E-PHORESIS SERUM: CPT

## 2025-03-21 PROCEDURE — 83550 IRON BINDING TEST: CPT

## 2025-03-21 PROCEDURE — 83540 ASSAY OF IRON: CPT

## 2025-03-22 LAB — PROT SERPL-MCNC: 6.6 G/DL (ref 6.4–8.2)

## 2025-03-24 DIAGNOSIS — D75.1 ERYTHROCYTOSIS: Primary | ICD-10-CM

## 2025-03-24 DIAGNOSIS — E61.1 IRON DEFICIENCY: ICD-10-CM

## 2025-03-24 LAB
ALBUMIN: 4.3 G/DL (ref 3.4–5)
ALPHA 1 GLOBULIN: 0.3 G/DL (ref 0.2–0.6)
ALPHA 2 GLOBULIN: 0.8 G/DL (ref 0.4–1.1)
B2 MICROGLOB SERPL-MCNC: 2.2 MG/L (ref 0.7–2.2)
BETA GLOBULIN: 0.7 G/DL (ref 0.5–1.2)
GAMMA GLOBULIN: 0.5 G/DL (ref 0.5–1.4)
IRON SATN MFR SERPL: 43 % (ref 25–45)
IRON SERPL-MCNC: 156 UG/DL (ref 35–150)
M-PROTEIN 1: 0.1 G/DL
PATH REVIEW-SERUM PROTEIN ELECTROPHORESIS: ABNORMAL
PROTEIN ELECTROPHORESIS COMMENT: ABNORMAL
TIBC SERPL-MCNC: 367 UG/DL (ref 240–445)
UIBC SERPL-MCNC: 211 UG/DL (ref 110–370)

## 2025-03-25 LAB
KAPPA LC SERPL-MCNC: 1.53 MG/DL (ref 0.33–1.94)
KAPPA LC/LAMBDA SER: 1.82 {RATIO} (ref 0.26–1.65)
LAMBDA LC SERPL-MCNC: 0.84 MG/DL (ref 0.57–2.63)

## 2025-04-30 DIAGNOSIS — M81.6 LOCALIZED OSTEOPOROSIS WITHOUT CURRENT PATHOLOGICAL FRACTURE: Primary | ICD-10-CM

## 2025-05-02 ENCOUNTER — APPOINTMENT (OUTPATIENT)
Dept: CARDIOLOGY | Facility: HOSPITAL | Age: 75
End: 2025-05-02
Payer: COMMERCIAL

## 2025-05-03 DIAGNOSIS — D75.1 ACQUIRED POLYCYTHEMIA: ICD-10-CM

## 2025-05-03 DIAGNOSIS — D47.2 MGUS (MONOCLONAL GAMMOPATHY OF UNKNOWN SIGNIFICANCE): ICD-10-CM

## 2025-05-03 DIAGNOSIS — I10 PRIMARY HYPERTENSION: ICD-10-CM

## 2025-05-03 DIAGNOSIS — N32.89 IRRITABLE BLADDER: ICD-10-CM

## 2025-05-03 DIAGNOSIS — E21.0 PRIMARY HYPERPARATHYROIDISM (MULTI): ICD-10-CM

## 2025-05-06 RX ORDER — LOSARTAN POTASSIUM 50 MG/1
50 TABLET ORAL DAILY
Qty: 90 TABLET | Refills: 3 | Status: SHIPPED | OUTPATIENT
Start: 2025-05-06

## 2025-05-06 RX ORDER — OXYBUTYNIN CHLORIDE 15 MG/1
15 TABLET, EXTENDED RELEASE ORAL DAILY
Qty: 90 TABLET | Refills: 3 | Status: SHIPPED | OUTPATIENT
Start: 2025-05-06

## 2025-05-06 RX ORDER — VERAPAMIL HCL 240 MG
240 TABLET, EXTENDED RELEASE ORAL DAILY
Qty: 90 TABLET | Refills: 3 | Status: SHIPPED | OUTPATIENT
Start: 2025-05-06

## 2025-05-09 ENCOUNTER — OFFICE VISIT (OUTPATIENT)
Dept: CARDIOLOGY | Facility: HOSPITAL | Age: 75
End: 2025-05-09
Payer: COMMERCIAL

## 2025-05-09 VITALS
HEART RATE: 105 BPM | WEIGHT: 90.17 LBS | BODY MASS INDEX: 16.49 KG/M2 | SYSTOLIC BLOOD PRESSURE: 128 MMHG | DIASTOLIC BLOOD PRESSURE: 65 MMHG

## 2025-05-09 DIAGNOSIS — I73.9 PERIPHERAL ARTERIAL DISEASE: Primary | ICD-10-CM

## 2025-05-09 PROCEDURE — 99214 OFFICE O/P EST MOD 30 MIN: CPT | Performed by: INTERNAL MEDICINE

## 2025-05-09 PROCEDURE — 3074F SYST BP LT 130 MM HG: CPT | Performed by: INTERNAL MEDICINE

## 2025-05-09 PROCEDURE — 1159F MED LIST DOCD IN RCRD: CPT | Performed by: INTERNAL MEDICINE

## 2025-05-09 PROCEDURE — 3078F DIAST BP <80 MM HG: CPT | Performed by: INTERNAL MEDICINE

## 2025-05-09 RX ORDER — NAPROXEN SODIUM 220 MG/1
81 TABLET, FILM COATED ORAL DAILY
Qty: 90 TABLET | Refills: 3 | Status: SHIPPED | OUTPATIENT
Start: 2025-05-09 | End: 2026-05-09

## 2025-05-09 NOTE — PROGRESS NOTES
Primary Care Physician: Stanislav Brooks MD   Date of Visit: 05/09/2025  1:30 PM EDT  Type of Visit: New Patient      Chief Complaint:  No chief complaint on file.       HPI  Isidra Bond 74 y.o. female who presents establish care.    Her wound has completely healed.  She continues to have right calf claudication (fatigue/pain) that occurs on a roundtrip to her mailbox, (approximately 150 feet).  Reports that she had muscle pain as well, but that improved with cessation of atorvastatin and rosuvastatin.  She is not interested in any further statins.    Review of Systems   12 point review of systems was obtained in detail and is negative other than that noted above or below.       Past Medical/Surgical History:  Isidra has a past medical history of Bitten by cat, initial encounter (11/28/2016), Encounter for gynecological examination (general) (routine) without abnormal findings (03/28/2017), Encounter for screening for malignant neoplasm of cervix (02/23/2015), Pelvic and perineal pain (03/19/2018), Personal history of other diseases of the female genital tract (09/14/2020), Personal history of other diseases of the nervous system and sense organs (06/06/2013), Personal history of other endocrine, nutritional and metabolic disease (03/28/2017), Personal history of other specified conditions (06/05/2017), Pleurodynia (02/09/2021), Rash and other nonspecific skin eruption (04/01/2016), Unspecified abnormal findings in urine, and Urinary tract infection, site not specified (09/05/2019).    Isidra has a past surgical history that includes Other surgical history (05/05/2022); Tubal ligation (02/24/2016); Other surgical history (02/24/2016); Mouth surgery (02/24/2016); Neck surgery (02/24/2016); Tonsillectomy (02/24/2016); and Colonoscopy (09/14/2020).    Social/Family History:  Isidra reports that she has been smoking cigarettes. She has never used smokeless tobacco. She reports current alcohol use of about 1.0  standard drink of alcohol per week. She reports that she does not use drugs.    Isidra's family history includes Alzheimer's disease in an other family member; Parkinsonism in an other family member; Peripheral vascular disease in an other family member.    Allergies:  Allergies   Allergen Reactions    Alendronate Swelling    Atorvastatin Other     Muscle weakness    Boniva [Ibandronate] Swelling    Statins-Hmg-Coa Reductase Inhibitors Myalgia    Penicillins Rash       Medications:  Current Outpatient Medications   Medication Sig Dispense Refill    latanoprost (Xalatan) 0.005 % ophthalmic solution Administer 1 drop into both eyes once daily at bedtime.      losartan (Cozaar) 50 mg tablet TAKE 1 TABLET DAILY 90 tablet 3    oxybutynin XL (Ditropan-XL) 15 mg 24 hr tablet TAKE 1 TABLET DAILY 90 tablet 3    verapamil SR (Calan-SR) 240 mg ER tablet TAKE 1 TABLET DAILY 90 tablet 3    fluticasone-umeclidin-vilanter (Trelegy Ellipta) 100-62.5-25 mcg blister with device Inhale 1 puff once daily. 14 each 0     No current facility-administered medications for this visit.       Objective:   Vitals:    05/09/25 1328   BP: 128/65   Pulse: 105       S1-S2 normal, regular rate and rhythm, normal rubs or gallops  Clear to auscultation bilaterally    Labs and Imaging:      Latest Ref Rng & Units 2/23/2022     6:23 PM 2/24/2022     7:31 AM 2/24/2022    12:23 PM 5/30/2023     9:19 AM 10/31/2023     8:59 AM 5/1/2024     8:56 AM 11/1/2024    11:20 AM   Electrolytes   Na 136 - 145 mmol/L 139  145  138  143  143  142  141    K 3.5 - 5.3 mmol/L 4.6  2.3  4.7  4.7  4.5  4.3  4.8    Cl 98 - 107 mmol/L 102  121  105  108  107  103  107    CO2 21 - 32 mmol/L 28  17  28  28  26  29  29    Cr 0.50 - 1.05 mg/dL 0.69  0.25  0.58  0.71  0.75  0.79  0.76    Ca 8.6 - 10.3 mg/dL 11.0  5.1  9.9  11.5  11.0  11.4  11.3          Latest Ref Rng & Units 6/8/2021    10:02 AM 2/23/2022     6:23 PM 2/24/2022     5:44 AM 2/24/2022    12:23 PM 5/30/2023     9:19  AM 5/1/2024     8:56 AM 11/1/2024    11:20 AM   CBC   Hb 12.0 - 16.0 g/dL 15.5  17.2  13.7  16.0  17.4  17.8  17.0    Hct 36.0 - 46.0 % 46.4  51.0  42.2  47.7  53.4  54.3  52.2    MCV 80 - 100 fL 105  104  108  103  103  101  103    RDW 11.5 - 14.5 % 12.4  13.6  13.3  13.4  12.8  12.5  12.9          Latest Ref Rng & Units 9/14/2020     9:40 AM 6/5/2021    10:31 PM 6/8/2021    10:02 AM 2/23/2022     6:23 PM 5/30/2023     9:19 AM 5/1/2024     8:56 AM 11/1/2024    11:20 AM   Lipids   Chol 0 - 199 mg/dL 221          HDL mg/dL 74.7          LDL 0 - 99 mg/dL 130          Trig 0 - 149 mg/dL 83          ALT 7 - 45 U/L 12  15  17  18  10  10  10    AST 9 - 39 U/L 14  13  16  22  15  16  14          Latest Ref Rng & Units 5/10/2018    12:00 AM 9/5/2019     9:36 AM 9/14/2020     9:40 AM 5/5/2022    11:08 AM   Thyroid   TSH 0.44 - 3.98 mIU/L 1.39  1.33  1.33  1.40           No data to display                 Lab Results   Component Value Date    HGBA1C 5.1 05/30/2023        The ASCVD Risk score (Rachael DK, et al., 2019) failed to calculate for the following reasons:    Cannot find a previous HDL lab    Cannot find a previous total cholesterol lab     Echocardiogram: No results found for this or any previous visit.     Echocardiogram: No results found for this or any previous visit from the past 1825 days.    Stress Testing: No results found for this or any previous visit from the past 1825 days.    Cardiac Catheterization: No results found for this or any previous visit from the past 1825 days.    Cardiac Scoring: No results found for this or any previous visit from the past 1825 days.    AAA : No results found for this or any previous visit from the past 1825 days.    OTHER: No results found for this or any previous visit from the past 1825 days.        Assessment/Plan:   No diagnosis found.       Isidra Bond 74 y.o. female who presents for follow-up:    1.  Peripheral arterial disease    Overall doing well with adequate  wound healing.  Intolerant to statins.  Will start aspirin 81 mg daily.  She can follow-up in 1 year.     ____________________________________________________________  Mikel Wilburn MD

## 2025-06-12 ENCOUNTER — LAB (OUTPATIENT)
Dept: LAB | Facility: HOSPITAL | Age: 75
End: 2025-06-12
Payer: COMMERCIAL

## 2025-06-12 ENCOUNTER — LAB REQUISITION (OUTPATIENT)
Dept: LAB | Facility: HOSPITAL | Age: 75
End: 2025-06-12
Payer: COMMERCIAL

## 2025-06-12 DIAGNOSIS — E83.52 HYPERCALCEMIA: Primary | ICD-10-CM

## 2025-06-12 DIAGNOSIS — M81.6 LOCALIZED OSTEOPOROSIS (LEQUESNE): ICD-10-CM

## 2025-06-12 LAB
ALBUMIN SERPL BCP-MCNC: 4.7 G/DL (ref 3.4–5)
ALP SERPL-CCNC: 103 U/L (ref 33–136)
ALT SERPL W P-5'-P-CCNC: 9 U/L (ref 7–45)
ANION GAP SERPL CALC-SCNC: 13 MMOL/L (ref 10–20)
AST SERPL W P-5'-P-CCNC: 13 U/L (ref 9–39)
BILIRUB SERPL-MCNC: 0.7 MG/DL (ref 0–1.2)
BUN SERPL-MCNC: 12 MG/DL (ref 6–23)
CALCIUM SERPL-MCNC: 12.3 MG/DL (ref 8.6–10.3)
CHLORIDE SERPL-SCNC: 100 MMOL/L (ref 98–107)
CO2 SERPL-SCNC: 30 MMOL/L (ref 21–32)
CREAT SERPL-MCNC: 0.8 MG/DL (ref 0.5–1.05)
EGFRCR SERPLBLD CKD-EPI 2021: 76 ML/MIN/1.73M*2
GLUCOSE SERPL-MCNC: 88 MG/DL (ref 74–99)
POTASSIUM SERPL-SCNC: 4.4 MMOL/L (ref 3.5–5.3)
PROT SERPL-MCNC: 6.8 G/DL (ref 6.4–8.2)
SODIUM SERPL-SCNC: 139 MMOL/L (ref 136–145)

## 2025-06-12 PROCEDURE — 80053 COMPREHEN METABOLIC PANEL: CPT

## 2025-06-23 LAB — HFE GENE MUT ANL BLD/T: NORMAL

## 2025-07-08 ENCOUNTER — OFFICE VISIT (OUTPATIENT)
Dept: PRIMARY CARE | Facility: CLINIC | Age: 75
End: 2025-07-08
Payer: COMMERCIAL

## 2025-07-08 ENCOUNTER — APPOINTMENT (OUTPATIENT)
Dept: PRIMARY CARE | Facility: CLINIC | Age: 75
End: 2025-07-08
Payer: COMMERCIAL

## 2025-07-08 VITALS
DIASTOLIC BLOOD PRESSURE: 73 MMHG | TEMPERATURE: 96.6 F | WEIGHT: 86 LBS | HEIGHT: 62 IN | HEART RATE: 111 BPM | SYSTOLIC BLOOD PRESSURE: 118 MMHG | BODY MASS INDEX: 15.83 KG/M2

## 2025-07-08 DIAGNOSIS — M81.6 LOCALIZED OSTEOPOROSIS WITHOUT CURRENT PATHOLOGICAL FRACTURE: ICD-10-CM

## 2025-07-08 DIAGNOSIS — E78.2 MIXED HYPERLIPIDEMIA: ICD-10-CM

## 2025-07-08 DIAGNOSIS — E83.110 HEREDITARY HEMOCHROMATOSIS: ICD-10-CM

## 2025-07-08 DIAGNOSIS — R73.9 HYPERGLYCEMIA: ICD-10-CM

## 2025-07-08 DIAGNOSIS — Z00.00 ROUTINE GENERAL MEDICAL EXAMINATION AT HEALTH CARE FACILITY: Primary | ICD-10-CM

## 2025-07-08 DIAGNOSIS — N32.89 IRRITABLE BLADDER: ICD-10-CM

## 2025-07-08 DIAGNOSIS — J43.9 PULMONARY EMPHYSEMA, UNSPECIFIED EMPHYSEMA TYPE (MULTI): ICD-10-CM

## 2025-07-08 DIAGNOSIS — D75.1 ACQUIRED POLYCYTHEMIA: ICD-10-CM

## 2025-07-08 DIAGNOSIS — I10 PRIMARY HYPERTENSION: ICD-10-CM

## 2025-07-08 DIAGNOSIS — M25.551 RIGHT HIP PAIN: ICD-10-CM

## 2025-07-08 DIAGNOSIS — D47.2 MGUS (MONOCLONAL GAMMOPATHY OF UNKNOWN SIGNIFICANCE): ICD-10-CM

## 2025-07-08 DIAGNOSIS — E21.3 HYPERPARATHYROIDISM (MULTI): ICD-10-CM

## 2025-07-08 DIAGNOSIS — E21.0 PRIMARY HYPERPARATHYROIDISM (MULTI): ICD-10-CM

## 2025-07-08 PROBLEM — M85.80 OSTEOPENIA: Status: RESOLVED | Noted: 2023-05-30 | Resolved: 2025-07-08

## 2025-07-08 PROCEDURE — 3078F DIAST BP <80 MM HG: CPT | Performed by: INTERNAL MEDICINE

## 2025-07-08 PROCEDURE — 3074F SYST BP LT 130 MM HG: CPT | Performed by: INTERNAL MEDICINE

## 2025-07-08 PROCEDURE — 1160F RVW MEDS BY RX/DR IN RCRD: CPT | Performed by: INTERNAL MEDICINE

## 2025-07-08 PROCEDURE — G0439 PPPS, SUBSEQ VISIT: HCPCS | Performed by: INTERNAL MEDICINE

## 2025-07-08 PROCEDURE — 1126F AMNT PAIN NOTED NONE PRSNT: CPT | Performed by: INTERNAL MEDICINE

## 2025-07-08 PROCEDURE — 4004F PT TOBACCO SCREEN RCVD TLK: CPT | Performed by: INTERNAL MEDICINE

## 2025-07-08 PROCEDURE — G2211 COMPLEX E/M VISIT ADD ON: HCPCS | Performed by: INTERNAL MEDICINE

## 2025-07-08 PROCEDURE — 1159F MED LIST DOCD IN RCRD: CPT | Performed by: INTERNAL MEDICINE

## 2025-07-08 PROCEDURE — 1170F FXNL STATUS ASSESSED: CPT | Performed by: INTERNAL MEDICINE

## 2025-07-08 RX ORDER — LOSARTAN POTASSIUM 50 MG/1
50 TABLET ORAL DAILY
Qty: 90 TABLET | Refills: 3 | Status: SHIPPED | OUTPATIENT
Start: 2025-07-08

## 2025-07-08 RX ORDER — VERAPAMIL HCL 240 MG
240 TABLET, EXTENDED RELEASE ORAL DAILY
Qty: 90 TABLET | Refills: 3 | Status: SHIPPED | OUTPATIENT
Start: 2025-07-08

## 2025-07-08 RX ORDER — OXYBUTYNIN CHLORIDE 15 MG/1
15 TABLET, EXTENDED RELEASE ORAL DAILY
Qty: 90 TABLET | Refills: 3 | Status: SHIPPED | OUTPATIENT
Start: 2025-07-08

## 2025-07-08 ASSESSMENT — ACTIVITIES OF DAILY LIVING (ADL)
MANAGING_FINANCES: INDEPENDENT
BATHING: INDEPENDENT
GROCERY_SHOPPING: INDEPENDENT
DRESSING: INDEPENDENT
TAKING_MEDICATION: INDEPENDENT
DOING_HOUSEWORK: INDEPENDENT

## 2025-07-08 ASSESSMENT — PATIENT HEALTH QUESTIONNAIRE - PHQ9
1. LITTLE INTEREST OR PLEASURE IN DOING THINGS: NOT AT ALL
2. FEELING DOWN, DEPRESSED OR HOPELESS: NOT AT ALL
SUM OF ALL RESPONSES TO PHQ9 QUESTIONS 1 AND 2: 0

## 2025-07-08 ASSESSMENT — ENCOUNTER SYMPTOMS
PALPITATIONS: 0
FEVER: 0
CHILLS: 0
COUGH: 0
POLYDIPSIA: 0
SHORTNESS OF BREATH: 0

## 2025-07-08 ASSESSMENT — PAIN SCALES - GENERAL: PAINLEVEL_OUTOF10: 0-NO PAIN

## 2025-07-08 NOTE — ASSESSMENT & PLAN NOTE
Recheck 2026. Declined treatment previously.     Orders:    XR hip right with pelvis when performed 2 or 3 views; Future    Comprehensive Metabolic Panel; Future    Albumin-Creatinine Ratio, Urine Random; Future    Lipid Panel; Future    Hemoglobin A1C; Future    PTH, intact; Future    Iron and TIBC; Future    CBC and Auto Differential; Future    [place]/Lambda Free Light Chain, Serum; Future

## 2025-07-08 NOTE — ASSESSMENT & PLAN NOTE
Orders:    oxyBUTYnin XL (Ditropan-XL) 15 mg 24 hr tablet; Take 1 tablet (15 mg) by mouth once daily.

## 2025-07-08 NOTE — ASSESSMENT & PLAN NOTE
Testing ordered  Orders:    verapamil SR (Calan-SR) 240 mg ER tablet; Take 1 tablet (240 mg) by mouth once daily.    losartan (Cozaar) 50 mg tablet; Take 1 tablet (50 mg) by mouth once daily.    Comprehensive Metabolic Panel; Future    Albumin-Creatinine Ratio, Urine Random; Future    Lipid Panel; Future    Hemoglobin A1C; Future    PTH, intact; Future    Iron and TIBC; Future    CBC and Auto Differential; Future    [place]/Lambda Free Light Chain, Serum; Future

## 2025-07-08 NOTE — ASSESSMENT & PLAN NOTE
controlled  Orders:    verapamil SR (Calan-SR) 240 mg ER tablet; Take 1 tablet (240 mg) by mouth once daily.    losartan (Cozaar) 50 mg tablet; Take 1 tablet (50 mg) by mouth once daily.    Comprehensive Metabolic Panel; Future    Albumin-Creatinine Ratio, Urine Random; Future    Lipid Panel; Future    Hemoglobin A1C; Future    PTH, intact; Future    Iron and TIBC; Future    CBC and Auto Differential; Future    [place]/Lambda Free Light Chain, Serum; Future

## 2025-07-08 NOTE — ASSESSMENT & PLAN NOTE
Orders:    Comprehensive Metabolic Panel; Future    Albumin-Creatinine Ratio, Urine Random; Future    Lipid Panel; Future    Hemoglobin A1C; Future    PTH, intact; Future    Iron and TIBC; Future    CBC and Auto Differential; Future    [place]/Lambda Free Light Chain, Serum; Future

## 2025-07-08 NOTE — ASSESSMENT & PLAN NOTE
Orders:    verapamil SR (Calan-SR) 240 mg ER tablet; Take 1 tablet (240 mg) by mouth once daily.    losartan (Cozaar) 50 mg tablet; Take 1 tablet (50 mg) by mouth once daily.    Comprehensive Metabolic Panel; Future    Albumin-Creatinine Ratio, Urine Random; Future    Lipid Panel; Future    Hemoglobin A1C; Future    PTH, intact; Future    Iron and TIBC; Future    CBC and Auto Differential; Future    [place]/Lambda Free Light Chain, Serum; Future

## 2025-07-08 NOTE — PROGRESS NOTES
Subjective   Reason for Visit: Isidra Bond is an 75 y.o. female here for a Medicare Wellness visit.     Past Medical, Surgical, and Family History reviewed and updated in chart.    Reviewed all medications by prescribing practitioner or clinical pharmacist (such as prescriptions, OTCs, herbal therapies and supplements) and documented in the medical record.    Patient presents today for an annual wellness exam    Medical history and surgical history updated today  Medication list reconciled and updated  Patient denies vision issues at this time: glaucoma- follows with specialist  Patient denies hearing issues at this time  Follows with a dentist: No, false teeth    Patient counseled about available preventative health vaccinations and provided with opportunity to update them with our office or through prescription to preferred pharmacy.    Reviewed and discussed preventative health screening recommendations for colon cancer: age related screening completed.     Reviewed and discussed preventative health recommendations for screening for cervical cancer and breast cancer: age related screening completed.     DEXA: completed 2024- due 2026. Osteoporosis. Subjective    Isidra Bond is a 75 y.o. female with known COPD who presents without exacerbation. Current symptoms include: chronic dyspnea. Patient uses 1 pillows at night. Patient currently is not on home oxygen therapy.    Objective  FEV1/FVC: Not completed when ordered    Assessment/Plan  Emphysema.  Discussed diagnosis, its evaluation, treatment and usual course.  All questions answered.  Educational material distributed.  Return to clinic in 6 months or as needed.    Limiting caffeine better- but still getting some. Reduce nocturia through this and her medication- which is tolerated well.     76yo- aged out of Lung screening and previously declined.     Pre contemplative for tobacco cessation.         Patient Care Team:  Stanislav Brooks MD as PCP -  "General     Review of Systems   Constitutional:  Negative for chills and fever.   Respiratory:  Negative for cough and shortness of breath.    Cardiovascular:  Negative for chest pain and palpitations.   Endocrine: Negative for polydipsia and polyuria.       Objective   Vitals:  /73 (BP Location: Left arm, Patient Position: Sitting, BP Cuff Size: Adult)   Pulse (!) 111   Temp 35.9 °C (96.6 °F) (Temporal)   Ht 1.575 m (5' 2\")   Wt (!) 39 kg (86 lb)   BMI 15.73 kg/m²       Physical Exam  Constitutional:       Appearance: Normal appearance.   HENT:      Head: Normocephalic and atraumatic.      Right Ear: Tympanic membrane normal. There is no impacted cerumen.      Left Ear: Tympanic membrane normal. There is no impacted cerumen.      Nose: Nose normal.      Mouth/Throat:      Mouth: Mucous membranes are moist.   Eyes:      Extraocular Movements: Extraocular movements intact.      Conjunctiva/sclera: Conjunctivae normal.      Pupils: Pupils are equal, round, and reactive to light.   Neck:      Thyroid: No thyroid mass, thyromegaly or thyroid tenderness.      Vascular: No carotid bruit.   Cardiovascular:      Rate and Rhythm: Normal rate and regular rhythm.      Heart sounds: No murmur heard.     No friction rub. No gallop.   Pulmonary:      Effort: Pulmonary effort is normal. No respiratory distress.      Breath sounds: No wheezing, rhonchi or rales.   Abdominal:      General: Bowel sounds are normal.      Palpations: Abdomen is soft.      Tenderness: There is no abdominal tenderness. There is no guarding.      Hernia: No hernia is present.   Musculoskeletal:      Cervical back: Neck supple. No tenderness.      Right lower leg: No edema.      Left lower leg: No edema.   Lymphadenopathy:      Cervical: No cervical adenopathy.   Skin:     Coloration: Skin is not jaundiced.      Findings: No bruising, erythema or rash.   Neurological:      General: No focal deficit present.      Mental Status: She is alert and " oriented to person, place, and time.      Cranial Nerves: No cranial nerve deficit.      Sensory: No sensory deficit.      Motor: Weakness (general, diffuse) present.      Gait: Gait abnormal.   Psychiatric:         Mood and Affect: Mood normal.         Assessment & Plan  Routine general medical examination at health care facility    Orders:    1 Year Follow Up In [facility] - PCP - Wellness Exam; Future    Pulmonary emphysema, unspecified emphysema type (Multi)  Clinically stable.        MGUS (monoclonal gammopathy of unknown significance)  Testing ordered  Orders:    verapamil SR (Calan-SR) 240 mg ER tablet; Take 1 tablet (240 mg) by mouth once daily.    losartan (Cozaar) 50 mg tablet; Take 1 tablet (50 mg) by mouth once daily.    Comprehensive Metabolic Panel; Future    Albumin-Creatinine Ratio, Urine Random; Future    Lipid Panel; Future    Hemoglobin A1C; Future    PTH, intact; Future    Iron and TIBC; Future    CBC and Auto Differential; Future    [place]/Lambda Free Light Chain, Serum; Future    Localized osteoporosis without current pathological fracture  Recheck 2026. Declined treatment previously.     Orders:    XR hip right with pelvis when performed 2 or 3 views; Future    Comprehensive Metabolic Panel; Future    Albumin-Creatinine Ratio, Urine Random; Future    Lipid Panel; Future    Hemoglobin A1C; Future    PTH, intact; Future    Iron and TIBC; Future    CBC and Auto Differential; Future    [place]/Lambda Free Light Chain, Serum; Future    Hereditary hemochromatosis    Orders:    Iron and TIBC; Future    Primary hyperparathyroidism (Multi)    Orders:    verapamil SR (Calan-SR) 240 mg ER tablet; Take 1 tablet (240 mg) by mouth once daily.    losartan (Cozaar) 50 mg tablet; Take 1 tablet (50 mg) by mouth once daily.    Comprehensive Metabolic Panel; Future    Albumin-Creatinine Ratio, Urine Random; Future    Lipid Panel; Future    Hemoglobin A1C; Future    PTH, intact; Future    Iron and TIBC;  Future    CBC and Auto Differential; Future    [place]/Lambda Free Light Chain, Serum; Future    Irritable bladder    Orders:    oxyBUTYnin XL (Ditropan-XL) 15 mg 24 hr tablet; Take 1 tablet (15 mg) by mouth once daily.    Hyperparathyroidism (Multi)    Orders:    Comprehensive Metabolic Panel; Future    Albumin-Creatinine Ratio, Urine Random; Future    Lipid Panel; Future    Hemoglobin A1C; Future    PTH, intact; Future    Iron and TIBC; Future    CBC and Auto Differential; Future    [place]/Lambda Free Light Chain, Serum; Future    Mixed hyperlipidemia    Orders:    Comprehensive Metabolic Panel; Future    Albumin-Creatinine Ratio, Urine Random; Future    Lipid Panel; Future    Hemoglobin A1C; Future    PTH, intact; Future    Iron and TIBC; Future    CBC and Auto Differential; Future    [place]/Lambda Free Light Chain, Serum; Future    Primary hypertension  controlled  Orders:    verapamil SR (Calan-SR) 240 mg ER tablet; Take 1 tablet (240 mg) by mouth once daily.    losartan (Cozaar) 50 mg tablet; Take 1 tablet (50 mg) by mouth once daily.    Comprehensive Metabolic Panel; Future    Albumin-Creatinine Ratio, Urine Random; Future    Lipid Panel; Future    Hemoglobin A1C; Future    PTH, intact; Future    Iron and TIBC; Future    CBC and Auto Differential; Future    [place]/Lambda Free Light Chain, Serum; Future    Hyperglycemia    Orders:    Hemoglobin A1C; Future    Right hip pain  Declined PT  Orders:    XR hip right with pelvis when performed 2 or 3 views; Future    Acquired polycythemia    Orders:    verapamil SR (Calan-SR) 240 mg ER tablet; Take 1 tablet (240 mg) by mouth once daily.    losartan (Cozaar) 50 mg tablet; Take 1 tablet (50 mg) by mouth once daily.    Comprehensive Metabolic Panel; Future    Albumin-Creatinine Ratio, Urine Random; Future    Lipid Panel; Future    Hemoglobin A1C; Future    PTH, intact; Future    Iron and TIBC; Future    CBC and Auto Differential; Future    [place]/Lambda Free Light Chain,  Serum; Future

## 2025-07-08 NOTE — ASSESSMENT & PLAN NOTE
Orders:    verapamil SR (Calan-SR) 240 mg ER tablet; Take 1 tablet (240 mg) by mouth once daily.    losartan (Cozaar) 50 mg tablet; Take 1 tablet (50 mg) by mouth once daily.    Comprehensive Metabolic Panel; Future    Albumin-Creatinine Ratio, Urine Random; Future    Lipid Panel; Future    Hemoglobin A1C; Future    PTH, intact; Future    Iron and TIBC; Future    CBC and Auto Differential; Future    [place]/Lambda Free Light Chain, Serum; Future

## 2025-10-06 ENCOUNTER — APPOINTMENT (OUTPATIENT)
Dept: PRIMARY CARE | Facility: CLINIC | Age: 75
End: 2025-10-06
Payer: COMMERCIAL